# Patient Record
Sex: FEMALE | Race: BLACK OR AFRICAN AMERICAN | NOT HISPANIC OR LATINO | Employment: OTHER | URBAN - METROPOLITAN AREA
[De-identification: names, ages, dates, MRNs, and addresses within clinical notes are randomized per-mention and may not be internally consistent; named-entity substitution may affect disease eponyms.]

---

## 2023-07-29 ENCOUNTER — HOSPITAL ENCOUNTER (OUTPATIENT)
Dept: DATA CONVERSION | Facility: HOSPITAL | Age: 88
Discharge: HOME | End: 2023-07-30
Attending: EMERGENCY MEDICINE

## 2023-07-29 DIAGNOSIS — M54.6 PAIN IN THORACIC SPINE: ICD-10-CM

## 2023-07-29 DIAGNOSIS — S22.089A UNSPECIFIED FRACTURE OF T11-T12 VERTEBRA, INITIAL ENCOUNTER FOR CLOSED FRACTURE (MULTI): Primary | ICD-10-CM

## 2023-07-29 DIAGNOSIS — S09.90XA UNSPECIFIED INJURY OF HEAD, INITIAL ENCOUNTER: ICD-10-CM
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
## 2023-12-22 ENCOUNTER — OFFICE VISIT (OUTPATIENT)
Dept: PRIMARY CARE | Facility: CLINIC | Age: 88
End: 2023-12-22
Payer: MEDICARE

## 2023-12-22 ENCOUNTER — LAB (OUTPATIENT)
Dept: LAB | Facility: LAB | Age: 88
End: 2023-12-22
Payer: MEDICARE

## 2023-12-22 VITALS
OXYGEN SATURATION: 93 % | HEART RATE: 98 BPM | SYSTOLIC BLOOD PRESSURE: 122 MMHG | WEIGHT: 103.2 LBS | BODY MASS INDEX: 20.15 KG/M2 | TEMPERATURE: 97.5 F | RESPIRATION RATE: 20 BRPM | DIASTOLIC BLOOD PRESSURE: 68 MMHG

## 2023-12-22 DIAGNOSIS — R30.0 DYSURIA: Primary | ICD-10-CM

## 2023-12-22 DIAGNOSIS — R63.4 WEIGHT LOSS: ICD-10-CM

## 2023-12-22 DIAGNOSIS — R10.13 EPIGASTRIC PAIN: ICD-10-CM

## 2023-12-22 DIAGNOSIS — N30.01 ACUTE CYSTITIS WITH HEMATURIA: ICD-10-CM

## 2023-12-22 LAB
ALBUMIN SERPL-MCNC: 4.2 G/DL (ref 3.5–5)
ALP BLD-CCNC: 60 U/L (ref 35–125)
ALT SERPL-CCNC: 17 U/L (ref 5–40)
ANION GAP SERPL CALC-SCNC: 9 MMOL/L
AST SERPL-CCNC: 16 U/L (ref 5–40)
BASOPHILS # BLD AUTO: 0.03 X10*3/UL (ref 0–0.1)
BASOPHILS NFR BLD AUTO: 0.7 %
BILIRUB SERPL-MCNC: 0.5 MG/DL (ref 0.1–1.2)
BUN SERPL-MCNC: 16 MG/DL (ref 8–25)
CALCIUM SERPL-MCNC: 9.9 MG/DL (ref 8.5–10.4)
CHLORIDE SERPL-SCNC: 104 MMOL/L (ref 97–107)
CO2 SERPL-SCNC: 28 MMOL/L (ref 24–31)
CREAT SERPL-MCNC: 0.9 MG/DL (ref 0.4–1.6)
EOSINOPHIL # BLD AUTO: 0.07 X10*3/UL (ref 0–0.4)
EOSINOPHIL NFR BLD AUTO: 1.6 %
ERYTHROCYTE [DISTWIDTH] IN BLOOD BY AUTOMATED COUNT: 12.9 % (ref 11.5–14.5)
GFR SERPL CREATININE-BSD FRML MDRD: 61 ML/MIN/1.73M*2
GLUCOSE SERPL-MCNC: 99 MG/DL (ref 65–99)
HCT VFR BLD AUTO: 44.1 % (ref 36–46)
HGB BLD-MCNC: 14 G/DL (ref 12–16)
IMM GRANULOCYTES # BLD AUTO: 0.01 X10*3/UL (ref 0–0.5)
IMM GRANULOCYTES NFR BLD AUTO: 0.2 % (ref 0–0.9)
LIPASE SERPL-CCNC: 63 U/L (ref 16–63)
LYMPHOCYTES # BLD AUTO: 1.71 X10*3/UL (ref 0.8–3)
LYMPHOCYTES NFR BLD AUTO: 39.3 %
MCH RBC QN AUTO: 29.8 PG (ref 26–34)
MCHC RBC AUTO-ENTMCNC: 31.7 G/DL (ref 32–36)
MCV RBC AUTO: 94 FL (ref 80–100)
MONOCYTES # BLD AUTO: 0.28 X10*3/UL (ref 0.05–0.8)
MONOCYTES NFR BLD AUTO: 6.4 %
NEUTROPHILS # BLD AUTO: 2.25 X10*3/UL (ref 1.6–5.5)
NEUTROPHILS NFR BLD AUTO: 51.8 %
NRBC BLD-RTO: 0 /100 WBCS (ref 0–0)
PLATELET # BLD AUTO: 201 X10*3/UL (ref 150–450)
POC APPEARANCE, URINE: ABNORMAL
POC BILIRUBIN, URINE: ABNORMAL
POC BLOOD, URINE: ABNORMAL
POC COLOR, URINE: YELLOW
POC GLUCOSE, URINE: NEGATIVE MG/DL
POC KETONES, URINE: NEGATIVE MG/DL
POC LEUKOCYTES, URINE: ABNORMAL
POC NITRITE,URINE: NEGATIVE
POC PH, URINE: 5 PH
POC PROTEIN, URINE: ABNORMAL MG/DL
POC SPECIFIC GRAVITY, URINE: 1.02
POC UROBILINOGEN, URINE: 1 EU/DL
POTASSIUM SERPL-SCNC: 4.2 MMOL/L (ref 3.4–5.1)
PROT SERPL-MCNC: 6.5 G/DL (ref 5.9–7.9)
RBC # BLD AUTO: 4.7 X10*6/UL (ref 4–5.2)
SODIUM SERPL-SCNC: 141 MMOL/L (ref 133–145)
TSH SERPL DL<=0.05 MIU/L-ACNC: 2.75 MIU/L (ref 0.27–4.2)
VIT B12 SERPL-MCNC: 991 PG/ML (ref 211–946)
WBC # BLD AUTO: 4.4 X10*3/UL (ref 4.4–11.3)

## 2023-12-22 PROCEDURE — 81002 URINALYSIS NONAUTO W/O SCOPE: CPT | Performed by: STUDENT IN AN ORGANIZED HEALTH CARE EDUCATION/TRAINING PROGRAM

## 2023-12-22 PROCEDURE — 80053 COMPREHEN METABOLIC PANEL: CPT

## 2023-12-22 PROCEDURE — 1036F TOBACCO NON-USER: CPT | Performed by: STUDENT IN AN ORGANIZED HEALTH CARE EDUCATION/TRAINING PROGRAM

## 2023-12-22 PROCEDURE — 1126F AMNT PAIN NOTED NONE PRSNT: CPT | Performed by: STUDENT IN AN ORGANIZED HEALTH CARE EDUCATION/TRAINING PROGRAM

## 2023-12-22 PROCEDURE — 99214 OFFICE O/P EST MOD 30 MIN: CPT | Performed by: STUDENT IN AN ORGANIZED HEALTH CARE EDUCATION/TRAINING PROGRAM

## 2023-12-22 PROCEDURE — 85025 COMPLETE CBC W/AUTO DIFF WBC: CPT

## 2023-12-22 PROCEDURE — 99204 OFFICE O/P NEW MOD 45 MIN: CPT | Performed by: STUDENT IN AN ORGANIZED HEALTH CARE EDUCATION/TRAINING PROGRAM

## 2023-12-22 PROCEDURE — 87086 URINE CULTURE/COLONY COUNT: CPT | Performed by: STUDENT IN AN ORGANIZED HEALTH CARE EDUCATION/TRAINING PROGRAM

## 2023-12-22 PROCEDURE — 82607 VITAMIN B-12: CPT

## 2023-12-22 PROCEDURE — 83690 ASSAY OF LIPASE: CPT

## 2023-12-22 PROCEDURE — 36415 COLL VENOUS BLD VENIPUNCTURE: CPT

## 2023-12-22 PROCEDURE — 84443 ASSAY THYROID STIM HORMONE: CPT

## 2023-12-22 PROCEDURE — 1159F MED LIST DOCD IN RCRD: CPT | Performed by: STUDENT IN AN ORGANIZED HEALTH CARE EDUCATION/TRAINING PROGRAM

## 2023-12-22 RX ORDER — PANTOPRAZOLE SODIUM 20 MG/1
20 TABLET, DELAYED RELEASE ORAL
Qty: 30 TABLET | Refills: 11 | Status: SHIPPED | OUTPATIENT
Start: 2023-12-22 | End: 2024-05-27 | Stop reason: WASHOUT

## 2023-12-22 RX ORDER — CHOLECALCIFEROL (VITAMIN D3) 50 MCG
2000 TABLET ORAL DAILY
COMMUNITY

## 2023-12-22 RX ORDER — NITROFURANTOIN 25; 75 MG/1; MG/1
100 CAPSULE ORAL 2 TIMES DAILY
Qty: 10 CAPSULE | Refills: 0 | Status: SHIPPED | OUTPATIENT
Start: 2023-12-22 | End: 2023-12-27

## 2023-12-22 RX ORDER — GLUCOSAM/CHONDRO/HERB 149/HYAL 750-100 MG
1000 TABLET ORAL EVERY 24 HOURS
COMMUNITY

## 2023-12-22 RX ORDER — LATANOPROST 50 UG/ML
1 SOLUTION/ DROPS OPHTHALMIC DAILY
COMMUNITY

## 2023-12-22 RX ORDER — ASPIRIN 81 MG/1
81 TABLET ORAL DAILY
COMMUNITY

## 2023-12-22 RX ORDER — ALBUTEROL SULFATE 90 UG/1
2 AEROSOL, METERED RESPIRATORY (INHALATION) EVERY 4 HOURS PRN
COMMUNITY

## 2023-12-22 ASSESSMENT — COLUMBIA-SUICIDE SEVERITY RATING SCALE - C-SSRS
2. HAVE YOU ACTUALLY HAD ANY THOUGHTS OF KILLING YOURSELF?: NO
6. HAVE YOU EVER DONE ANYTHING, STARTED TO DO ANYTHING, OR PREPARED TO DO ANYTHING TO END YOUR LIFE?: NO
1. IN THE PAST MONTH, HAVE YOU WISHED YOU WERE DEAD OR WISHED YOU COULD GO TO SLEEP AND NOT WAKE UP?: NO

## 2023-12-22 ASSESSMENT — ENCOUNTER SYMPTOMS
DEPRESSION: 0
OCCASIONAL FEELINGS OF UNSTEADINESS: 1
LOSS OF SENSATION IN FEET: 0

## 2023-12-22 ASSESSMENT — PATIENT HEALTH QUESTIONNAIRE - PHQ9
1. LITTLE INTEREST OR PLEASURE IN DOING THINGS: NOT AT ALL
2. FEELING DOWN, DEPRESSED OR HOPELESS: NOT AT ALL
SUM OF ALL RESPONSES TO PHQ9 QUESTIONS 1 AND 2: 0

## 2023-12-22 ASSESSMENT — PAIN SCALES - GENERAL: PAINLEVEL: 0-NO PAIN

## 2023-12-22 NOTE — PATIENT INSTRUCTIONS
It was a pleasure to meet you today.    Go to the lab for blood work, we will call you with all results.    Antibiotic (Macrobid) and pantoprazole (acid reducer) sent to your pharmacy.    Try boost supplements to help increase your daily caloric intake.  Try to incorporate more healthy sources of high-calorie foods such as peanut butter, olive oil, avocados into your diet for weight gain rather than sweets such as cookies and donuts.    Gastroenterology referral entered today, call to schedule.

## 2023-12-22 NOTE — PROGRESS NOTES
Subjective   Kathy Tucker is a 89 y.o. female who presents for Abdominal Pain.    HPI:      New patient presenting with concern for abdominal pain/cramping with eating and dysuria.    Abdominal Pain and Weight Loss:  -Cramping every time she eats.  -Had been using Ensure for years to help maintain sufficient caloric intake, but was having diarrhea after every time she drinks this.  Stopped drinking Ensure a few months ago and diarrhea subsided.  She was previously drinking 2 per day.  She has noted weight loss in general, and especially since stopping Ensure.  States that she weighed 120 pounds 2 years ago, she is 103 pounds today.    -Reports loss of taste, she attributes this to the COVID-vaccine.  Feels like this also makes it hard for her to get sufficient calorie intake since nothing tastes good.  -Eats oatmeal, toast, breakfast bars.  Protein sources other than eggs include chicken and fish  -Mentions trying to eat doughnuts to help gain weight, asking for my thoughts on this.  -History of cholecystectomy in 2006.  Difficulty tolerating dairy.  No other previous GI issues.   -Prior GI specialist Dr. Bear with [facility], last seen for colonoscopy 5 years ago.  No hx of EGD.  Would like to see a different GI specialist if indicated.  -Declines offer for nutritionist/dietitian.    Dysupria:  Sees a specilist, has a pessary.    Dysuria for 2 months.    Hx of frequent UTIs  No abx allergies    Immunization History   Administered Date(s) Administered    Moderna SARS-CoV-2 Vaccination 03/10/2021, 11/17/2021, 05/20/2022     ROS:   Review of systems is essentially negative for all systems except for any identified issues in HPI above.    Objective     /68   Pulse 98   Temp 36.4 °C (97.5 °F)   Resp 20   Wt 46.8 kg (103 lb 3.2 oz)   SpO2 93%   BMI 20.15 kg/m²      PHYSICAL EXAM    GENERAL  Well-appearing, pleasant and cooperative.  No acute distress.    HEENT  HEAD:   Normocephalic.  Atraumatic.  EYES:  PERRLA.  No  scleral icterus or conjunctival injection.  NECK:  No adenopathy.  No palpable thyroid enlargement or nodules.    THROAT:  Moist oropharynx without tonsillar enlargement or exudates.    LUNGS:    Clear to auscultation bilaterally.  No wheezes, rales, rhonchi.    CARDIAC:  Regular rate and rhythm.  Normal S1S2.  No murmurs/rubs/gallops.    ABDOMEN:  Epigastric and Suprapubic TTP without guarding or rebound.  Abdomen otherwise soft, non-tender, non-distended.  No hepatosplenomegaly.  Normoactive bowel sounds.    MUSCULOSKELETAL:  No gross abnormalities.   No joint swelling or erythema,.  No spinal or paraspinal tenderness to palpation.    EXTREMITIES:  No LE edema or cyanosis.      NEURO           Alert and oriented x3. No focal deficits.    PSYCH:          Affect appropriate.           Assessment/Plan   Problem List Items Addressed This Visit    None  Visit Diagnoses       Dysuria    -  Primary    Relevant Orders    POCT UA (nonautomated) manually resulted (Completed)    Urine Culture    Epigastric pain        Relevant Medications    pantoprazole (Protonix) 20 mg EC tablet    Other Relevant Orders    Referral to Gastroenterology    CBC and Auto Differential    Comprehensive metabolic panel    Lipase    Weight loss        Relevant Orders    Referral to Gastroenterology    CBC and Auto Differential    Comprehensive metabolic panel    Lipase    Tsh With Reflex To Free T4 If Abnormal    Vitamin B12    Acute cystitis with hematuria        Relevant Medications    nitrofurantoin, macrocrystal-monohydrate, (Macrobid) 100 mg capsule                 Radha Can MD

## 2023-12-24 LAB — BACTERIA UR CULT: NORMAL

## 2023-12-27 ENCOUNTER — OFFICE VISIT (OUTPATIENT)
Dept: PRIMARY CARE | Facility: CLINIC | Age: 88
End: 2023-12-27
Payer: MEDICARE

## 2023-12-27 VITALS
HEIGHT: 60 IN | BODY MASS INDEX: 20.22 KG/M2 | SYSTOLIC BLOOD PRESSURE: 104 MMHG | WEIGHT: 103 LBS | TEMPERATURE: 97.4 F | DIASTOLIC BLOOD PRESSURE: 70 MMHG

## 2023-12-27 DIAGNOSIS — R30.0 DYSURIA: ICD-10-CM

## 2023-12-27 DIAGNOSIS — R39.9 UTI SYMPTOMS: Primary | ICD-10-CM

## 2023-12-27 DIAGNOSIS — R35.0 URINARY FREQUENCY: ICD-10-CM

## 2023-12-27 PROCEDURE — 1125F AMNT PAIN NOTED PAIN PRSNT: CPT | Performed by: STUDENT IN AN ORGANIZED HEALTH CARE EDUCATION/TRAINING PROGRAM

## 2023-12-27 PROCEDURE — 87086 URINE CULTURE/COLONY COUNT: CPT | Performed by: STUDENT IN AN ORGANIZED HEALTH CARE EDUCATION/TRAINING PROGRAM

## 2023-12-27 PROCEDURE — 99213 OFFICE O/P EST LOW 20 MIN: CPT | Performed by: STUDENT IN AN ORGANIZED HEALTH CARE EDUCATION/TRAINING PROGRAM

## 2023-12-27 PROCEDURE — 1159F MED LIST DOCD IN RCRD: CPT | Performed by: STUDENT IN AN ORGANIZED HEALTH CARE EDUCATION/TRAINING PROGRAM

## 2023-12-27 PROCEDURE — 1036F TOBACCO NON-USER: CPT | Performed by: STUDENT IN AN ORGANIZED HEALTH CARE EDUCATION/TRAINING PROGRAM

## 2023-12-27 ASSESSMENT — ENCOUNTER SYMPTOMS
OCCASIONAL FEELINGS OF UNSTEADINESS: 1
LOSS OF SENSATION IN FEET: 0
DEPRESSION: 0

## 2023-12-27 ASSESSMENT — PATIENT HEALTH QUESTIONNAIRE - PHQ9
1. LITTLE INTEREST OR PLEASURE IN DOING THINGS: NOT AT ALL
SUM OF ALL RESPONSES TO PHQ9 QUESTIONS 1 AND 2: 0
2. FEELING DOWN, DEPRESSED OR HOPELESS: NOT AT ALL

## 2023-12-27 ASSESSMENT — PAIN SCALES - GENERAL: PAINLEVEL: 3

## 2023-12-27 NOTE — PROGRESS NOTES
Subjective   Kathy Tucker is a 89 y.o. female who presents for No chief complaint on file..    HPI:      Here to follow up on recent negative urine culture and ongoing urinary sx.  Completed Macrobid as prescribed.    Goes to the bathroom 4-5 times every night.  4 times during the day.  No blood, some burning at nihgt.  Drinking water and cranberry juice.  Chronic use of panty liners at night only.    No irritation that she has noticed.  No nausea/vomiting, fevers/chills, or flank pain.    ROS:   Review of systems is essentially negative for all systems except for any identified issues in HPI above.    Objective     There were no vitals taken for this visit.     PHYSICAL EXAM    GENERAL  Well-appearing, pleasant and cooperative.  No acute distress.    HEENT  HEAD:   Normocephalic.  Atraumatic.  EYES:  PERRLA.  No scleral icterus or conjunctival injection.    NECK:  No adenopathy.  No palpable thyroid enlargement or nodules.    THROAT:  Moist oropharynx without tonsillar enlargement or exudates.    LUNGS:    Clear to auscultation bilaterally.  No wheezes, rales, rhonchi.    CARDIAC:  Regular rate and rhythm.  Normal S1S2.  No murmurs/rubs/gallops.    ABDOMEN:  Soft, non-tender, non-distended.  No hepatosplenomegaly.  Normoactive bowel sounds.  No CVAT bilaterlally.    MUSCULOSKELETAL:  No gross abnormalities.   No joint swelling or erythema,.  No spinal or paraspinal tenderness to palpation.    EXTREMITIES:  No LE edema or cyanosis.      NEURO           Alert and oriented x3. No focal deficits.    PSYCH:          Affect appropriate.           Assessment/Plan   Problem List Items Addressed This Visit    None  Visit Diagnoses       UTI symptoms    -  Primary    Relevant Orders    Urine Culture    Urinary frequency        Urology referral provided today.  Unable to do POCT urine dip d/t recent use of Azo - repeat urine culture ordered.  Offered estrogen vag. cream rx - declined.    Relevant Orders    Referral to Urology     Dysuria        Relevant Orders    Referral to Urology                 Radha Can MD

## 2023-12-28 LAB — BACTERIA UR CULT: NORMAL

## 2024-01-05 ENCOUNTER — HOSPITAL ENCOUNTER (OUTPATIENT)
Dept: RADIOLOGY | Facility: HOSPITAL | Age: 89
Discharge: HOME | End: 2024-01-05
Payer: MEDICARE

## 2024-01-05 DIAGNOSIS — R63.4 ABNORMAL WEIGHT LOSS: ICD-10-CM

## 2024-01-05 PROCEDURE — 2550000001 HC RX 255 CONTRASTS: Performed by: STUDENT IN AN ORGANIZED HEALTH CARE EDUCATION/TRAINING PROGRAM

## 2024-01-05 PROCEDURE — 74174 CTA ABD&PLVS W/CONTRAST: CPT

## 2024-01-05 RX ADMIN — IOHEXOL 75 ML: 350 INJECTION, SOLUTION INTRAVENOUS at 09:29

## 2024-01-08 ENCOUNTER — TELEPHONE (OUTPATIENT)
Dept: PRIMARY CARE | Facility: CLINIC | Age: 89
End: 2024-01-08
Payer: MEDICARE

## 2024-03-18 ENCOUNTER — APPOINTMENT (OUTPATIENT)
Dept: PREADMISSION TESTING | Facility: HOSPITAL | Age: 89
End: 2024-03-18
Payer: MEDICARE

## 2024-03-20 ENCOUNTER — APPOINTMENT (OUTPATIENT)
Dept: GASTROENTEROLOGY | Facility: HOSPITAL | Age: 89
End: 2024-03-20
Payer: MEDICARE

## 2024-05-21 ENCOUNTER — OFFICE VISIT (OUTPATIENT)
Dept: PRIMARY CARE | Facility: CLINIC | Age: 89
End: 2024-05-21
Payer: MEDICARE

## 2024-05-21 VITALS
BODY MASS INDEX: 21.79 KG/M2 | HEIGHT: 60 IN | WEIGHT: 111 LBS | DIASTOLIC BLOOD PRESSURE: 64 MMHG | SYSTOLIC BLOOD PRESSURE: 98 MMHG | HEART RATE: 75 BPM | OXYGEN SATURATION: 96 % | TEMPERATURE: 75 F

## 2024-05-21 DIAGNOSIS — R43.8 COVID-19 LONG HAULER MANIFESTING CHRONIC LOSS OF SMELL AND TASTE: Primary | ICD-10-CM

## 2024-05-21 DIAGNOSIS — U09.9 COVID-19 LONG HAULER MANIFESTING CHRONIC LOSS OF SMELL AND TASTE: Primary | ICD-10-CM

## 2024-05-21 DIAGNOSIS — R30.0 DYSURIA: ICD-10-CM

## 2024-05-21 DIAGNOSIS — R35.0 URINARY FREQUENCY: ICD-10-CM

## 2024-05-21 PROCEDURE — 1159F MED LIST DOCD IN RCRD: CPT | Performed by: STUDENT IN AN ORGANIZED HEALTH CARE EDUCATION/TRAINING PROGRAM

## 2024-05-21 PROCEDURE — 1125F AMNT PAIN NOTED PAIN PRSNT: CPT | Performed by: STUDENT IN AN ORGANIZED HEALTH CARE EDUCATION/TRAINING PROGRAM

## 2024-05-21 PROCEDURE — 99213 OFFICE O/P EST LOW 20 MIN: CPT | Performed by: STUDENT IN AN ORGANIZED HEALTH CARE EDUCATION/TRAINING PROGRAM

## 2024-05-21 ASSESSMENT — PATIENT HEALTH QUESTIONNAIRE - PHQ9
SUM OF ALL RESPONSES TO PHQ9 QUESTIONS 1 AND 2: 0
2. FEELING DOWN, DEPRESSED OR HOPELESS: NOT AT ALL
1. LITTLE INTEREST OR PLEASURE IN DOING THINGS: NOT AT ALL

## 2024-05-21 ASSESSMENT — PAIN SCALES - GENERAL: PAINLEVEL: 7

## 2024-05-21 ASSESSMENT — ENCOUNTER SYMPTOMS
LOSS OF SENSATION IN FEET: 1
DEPRESSION: 0
OCCASIONAL FEELINGS OF UNSTEADINESS: 1

## 2024-05-21 NOTE — PROGRESS NOTES
Subjective   Kathy Tucker is a 89 y.o. female who presents for bp check.    HPI:      89-year-old female presenting for follow up visit.      Saw urologist who gave her a cram which didn't work.  Dr. Rutledge=amalia Polk Estradiol Vaginal Cream.    Ongoing loss of taste and smell years after COVID infection.    ROS:   Review of systems is essentially negative for all systems except for any identified issues in HPI above.    Objective     BP 98/64   Pulse 75   Temp (!) 23.9 °C (75 °F)   Ht 1.524 m (5')   Wt 50.3 kg (111 lb)   SpO2 96%   BMI 21.68 kg/m²      PHYSICAL EXAM    GENERAL  Well-appearing, pleasant and cooperative.  No acute distress.    HEENT  HEAD:   Normocephalic.  Atraumatic.  EYES:  PERRLA.  No scleral icterus or conjunctival injection.  EARS:  Tympanic membranes visualized bilaterally without erythema, fluid, or bulging.  NECK:  No adenopathy.  No palpable thyroid enlargement or nodules.    THROAT:  Moist oropharynx without tonsillar enlargement or exudates.    LUNGS:    Clear to auscultation bilaterally.  No wheezes, rales, rhonchi.    CARDIAC:  Regular rate and rhythm.  Normal S1S2.  No murmurs/rubs/gallops.    ABDOMEN:  Soft, non-tender, non-distended.  No hepatosplenomegaly.  Normoactive bowel sounds.    MUSCULOSKELETAL:  No gross abnormalities.   No joint swelling or erythema,.  No spinal or paraspinal tenderness to palpation.    EXTREMITIES:  No LE edema or cyanosis.      NEURO           Alert and oriented x3. No focal deficits.    PSYCH:          Affect appropriate.           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    Please sign a release of information form for your urologist (Dr. Polk)  so we can review testing done at your visit with him and any additional recommendations.    You had a Medicare wellness visit with your previous provider on 11/13/2023.  You will be due in November for this visit with me.    Business cards provided today.    Follow-up with me in November for Medicare wellness visit, as needed for acute concerns.         Radha Can MD

## 2024-05-21 NOTE — PATIENT INSTRUCTIONS
Thank you for coming to see me today.    ENT referral entered today for further evaluation of ongoing loss of taste/smell, call to schedule.    Please sign a release of information form for your urologist (Dr. Polk) so we can review testing done at your visit with him and any additional recommendations.    You had a Medicare wellness visit with your previous provider on 11/13/2023.  You will be due in November for this visit with me.    Business cards provided today.    Follow-up with me in November for Medicare wellness visit, as needed for acute concerns.

## 2024-07-10 ENCOUNTER — LAB (OUTPATIENT)
Dept: LAB | Facility: LAB | Age: 89
End: 2024-07-10
Payer: MEDICARE

## 2024-07-10 ENCOUNTER — HOSPITAL ENCOUNTER (OUTPATIENT)
Dept: RADIOLOGY | Facility: HOSPITAL | Age: 89
Discharge: HOME | End: 2024-07-10
Payer: MEDICARE

## 2024-07-10 DIAGNOSIS — J43.9 EMPHYSEMA, UNSPECIFIED (MULTI): ICD-10-CM

## 2024-07-10 DIAGNOSIS — R53.82 CHRONIC FATIGUE, UNSPECIFIED: Primary | ICD-10-CM

## 2024-07-10 LAB
ALBUMIN SERPL-MCNC: 3.9 G/DL (ref 3.5–5)
ALP BLD-CCNC: 61 U/L (ref 35–125)
ALT SERPL-CCNC: 24 U/L (ref 5–40)
ANION GAP SERPL CALC-SCNC: 8 MMOL/L
AST SERPL-CCNC: 24 U/L (ref 5–40)
BASOPHILS # BLD AUTO: 0.03 X10*3/UL (ref 0–0.1)
BASOPHILS NFR BLD AUTO: 0.8 %
BILIRUB SERPL-MCNC: 0.4 MG/DL (ref 0.1–1.2)
BUN SERPL-MCNC: 13 MG/DL (ref 8–25)
CALCIUM SERPL-MCNC: 9.5 MG/DL (ref 8.5–10.4)
CHLORIDE SERPL-SCNC: 105 MMOL/L (ref 97–107)
CO2 SERPL-SCNC: 27 MMOL/L (ref 24–31)
CREAT SERPL-MCNC: 0.8 MG/DL (ref 0.4–1.6)
EGFRCR SERPLBLD CKD-EPI 2021: 70 ML/MIN/1.73M*2
EOSINOPHIL # BLD AUTO: 0.06 X10*3/UL (ref 0–0.4)
EOSINOPHIL NFR BLD AUTO: 1.6 %
ERYTHROCYTE [DISTWIDTH] IN BLOOD BY AUTOMATED COUNT: 12.9 % (ref 11.5–14.5)
GLUCOSE SERPL-MCNC: 95 MG/DL (ref 65–99)
HCT VFR BLD AUTO: 41.3 % (ref 36–46)
HGB BLD-MCNC: 13.1 G/DL (ref 12–16)
IMM GRANULOCYTES # BLD AUTO: 0.01 X10*3/UL (ref 0–0.5)
IMM GRANULOCYTES NFR BLD AUTO: 0.3 % (ref 0–0.9)
LYMPHOCYTES # BLD AUTO: 1.88 X10*3/UL (ref 0.8–3)
LYMPHOCYTES NFR BLD AUTO: 49.3 %
MCH RBC QN AUTO: 30.7 PG (ref 26–34)
MCHC RBC AUTO-ENTMCNC: 31.7 G/DL (ref 32–36)
MCV RBC AUTO: 97 FL (ref 80–100)
MONOCYTES # BLD AUTO: 0.27 X10*3/UL (ref 0.05–0.8)
MONOCYTES NFR BLD AUTO: 7.1 %
NEUTROPHILS # BLD AUTO: 1.56 X10*3/UL (ref 1.6–5.5)
NEUTROPHILS NFR BLD AUTO: 40.9 %
NRBC BLD-RTO: 0 /100 WBCS (ref 0–0)
PLATELET # BLD AUTO: 181 X10*3/UL (ref 150–450)
POTASSIUM SERPL-SCNC: 4.7 MMOL/L (ref 3.4–5.1)
PROT SERPL-MCNC: 6.1 G/DL (ref 5.9–7.9)
RBC # BLD AUTO: 4.27 X10*6/UL (ref 4–5.2)
SODIUM SERPL-SCNC: 140 MMOL/L (ref 133–145)
WBC # BLD AUTO: 3.8 X10*3/UL (ref 4.4–11.3)

## 2024-07-10 PROCEDURE — 85025 COMPLETE CBC W/AUTO DIFF WBC: CPT

## 2024-07-10 PROCEDURE — 71046 X-RAY EXAM CHEST 2 VIEWS: CPT | Performed by: RADIOLOGY

## 2024-07-10 PROCEDURE — 80053 COMPREHEN METABOLIC PANEL: CPT

## 2024-07-10 PROCEDURE — 71046 X-RAY EXAM CHEST 2 VIEWS: CPT

## 2024-07-10 PROCEDURE — 36415 COLL VENOUS BLD VENIPUNCTURE: CPT

## 2024-08-28 ENCOUNTER — APPOINTMENT (OUTPATIENT)
Dept: OTOLARYNGOLOGY | Facility: CLINIC | Age: 89
End: 2024-08-28
Payer: MEDICARE

## 2024-08-28 DIAGNOSIS — H91.90 DECREASED HEARING, UNSPECIFIED LATERALITY: ICD-10-CM

## 2024-08-28 DIAGNOSIS — H61.21 IMPACTED CERUMEN OF RIGHT EAR: ICD-10-CM

## 2024-08-28 DIAGNOSIS — J33.9 NASAL POLYP: ICD-10-CM

## 2024-08-28 DIAGNOSIS — U09.9 COVID-19 LONG HAULER MANIFESTING CHRONIC LOSS OF SMELL AND TASTE: Primary | ICD-10-CM

## 2024-08-28 DIAGNOSIS — R43.8 COVID-19 LONG HAULER MANIFESTING CHRONIC LOSS OF SMELL AND TASTE: Primary | ICD-10-CM

## 2024-08-28 PROCEDURE — 1036F TOBACCO NON-USER: CPT | Performed by: OTOLARYNGOLOGY

## 2024-08-28 PROCEDURE — 1159F MED LIST DOCD IN RCRD: CPT | Performed by: OTOLARYNGOLOGY

## 2024-08-28 PROCEDURE — 69210 REMOVE IMPACTED EAR WAX UNI: CPT | Performed by: OTOLARYNGOLOGY

## 2024-08-28 PROCEDURE — 99203 OFFICE O/P NEW LOW 30 MIN: CPT | Performed by: OTOLARYNGOLOGY

## 2024-08-28 RX ORDER — SOD CHLOR,BICARB/SQUEEZ BOTTLE
1 PACKET, WITH RINSE DEVICE NASAL DAILY
Qty: 60 EACH | Refills: 0 | Status: SHIPPED | OUTPATIENT
Start: 2024-08-28 | End: 2024-09-27

## 2024-08-28 RX ORDER — SOD CHLOR,BICARB/SQUEEZ BOTTLE
1 PACKET, WITH RINSE DEVICE NASAL DAILY
Qty: 60 EACH | Refills: 0 | Status: SHIPPED | OUTPATIENT
Start: 2024-08-28 | End: 2024-08-28

## 2024-08-28 RX ORDER — BUDESONIDE 0.5 MG/2ML
INHALANT ORAL
Qty: 60 ML | Refills: 1 | Status: SHIPPED | OUTPATIENT
Start: 2024-08-28

## 2024-08-28 NOTE — LETTER
"August 28, 2024     Radha Can MD  [id number] [facility]  [facility] [address]    Patient: Kathy Tucker   YOB: 1934   Date of Visit: 8/28/2024       Dear Dr. Radha Can MD:    Thank you for referring Kathy Tucker to me for evaluation. Below are my notes for this consultation.  If you have questions, please do not hesitate to call me. I look forward to following your patient along with you.       Sincerely,     Francine Blue MD      CC: No Recipients  ______________________________________________________________________________________    History Of Present Illness  Kathy Tucker is a 89 y.o. female presenting with: \"Lost my taste and smell, hearing test\".  She is kindly referred by Radha Can MD.    She has decreased sense of smell and taste for the last 3 years, started with covid.     On examination, patient has pale inferior turbinate mucosa. Nasal passages look open bilaterally. No visible postnasal discharge.  Small ~1x1 cm mobil adenopathy at left upper jugular area.    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    Recommendations:  1- smell retraining therapy  2- budesonide rinse for 3 months     Past Medical History  She has a past medical history of Lung cancer (Multi).    Surgical History  She has a past surgical history that includes CT angio abdomen pelvis w and or wo IV IV contrast (01/05/2024) and Other surgical history.     Social History  She reports that she has never smoked. She has never used smokeless tobacco. She reports that she does not drink alcohol and does not use drugs.    Family History  Family History   Problem Relation Name Age of Onset   • Cancer Mother          Allergies  Patient has no known allergies.    Review of Systems   Feeling tired  Difficulty hearing  Shortness of breath  Muscle pain  Sleep disturbance     Physical Exam    General appearance: Healthy-appearing, well-nourished, well groomed, in no acute " "distress.     Head and Face: Atraumatic with no masses, lesions, or scarring.      Salivary glands: No tenderness of the parotid glands or parotid masses.     No tenderness of the submandibular glands or submandibular masses.      Facial strength: Normal strength and symmetry, no synkinesis or facial tic.     Eyes: Conjunctivas look non-hyperemic bilaterally    Ears: Bilaterally wax was cleaned, ear canals look normal. Tympanic membranes look intact, no hyperemia, fluid or retraction.      Nose: Mucosa looks normal. No purulent discharge. Septum essentially straight.     Oral Cavity/Mouth: Lips and tongue look normal.     Throat: No postnasal discharge. No tonsil hypertrophy. No hyperemia.    Neck: Symmetrical, trachea midline.     Pulmonary: Normal respiratory effort.     Lymphatic: No palpable pathologic lymph nodes at neck.     Neurological/Psychiatric Orientation to person, place, and time: Normal.     Mood and affect: Normal.      Extremities: No clubbing.     Skin: No significant skin lesions were noted at face or neck        Procedure    EAR WAX REMOVAL 08.28.2024  Patient had bilateral ear wax. Using small instrument(s) and/or suction cleaning was done. Patient tolerated the procedure well.        Last Recorded Vitals  There were no vitals taken for this visit.    Relevant Results    Assessment and Plan:  Kathy Tucker is a 89 y.o. female presenting with: \"Lost my taste and smell, hearing test\".  She is kindly referred by Radha Can MD.    She has decreased sense of smell and taste for the last 3 years, started with covid.     On examination, patient has pale inferior turbinate mucosa. Nasal passages look open bilaterally. No visible postnasal discharge.  Small ~1x1 cm mobil adenopathy at left upper jugular area.    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    Recommendations:  1- smell retraining therapy  2- budesonide rinse for 3 months      [facility]  Otolaryngology - Head & Neck " Surgery

## 2024-08-28 NOTE — LETTER
"August 28, 2024     Radha Can MD  [address] [facility]  [facility] [address]    Patient: Kathy Tucker   YOB: 1934   Date of Visit: 8/28/2024       Dear Dr. Radha Can MD:    Thank you for referring Kathy Tucker to me for evaluation. Below are my notes for this consultation.  If you have questions, please do not hesitate to call me. I look forward to following your patient along with you.       Sincerely,     Francine Blue MD      CC: No Recipients  ______________________________________________________________________________________    History Of Present Illness  Kathy Tucker is a 89 y.o. female presenting with: \"Lost my taste and smell, hearing test\".  She is kindly referred by Radha Can MD.    She has decreased sense of smell and taste for the last 3 years, started with covid.     On examination, patient has pale inferior turbinate mucosa. Nasal passages look open bilaterally. No visible postnasal discharge.  Small ~1x1 cm mobil adenopathy at left upper jugular area.    Recommendations:  1- smell retraining therapy  2- budesonide rinse for 3 months     Past Medical History  She has a past medical history of Lung cancer (Multi).    Surgical History  She has a past surgical history that includes CT angio abdomen pelvis w and or wo IV IV contrast (01/05/2024) and Other surgical history.     Social History  She reports that she has never smoked. She has never used smokeless tobacco. She reports that she does not drink alcohol and does not use drugs.    Family History  Family History   Problem Relation Name Age of Onset   • Cancer Mother          Allergies  Patient has no known allergies.    Review of Systems   Feeling tired  Difficulty hearing  Shortness of breath  Muscle pain  Sleep disturbance     Physical Exam    General appearance: Healthy-appearing, well-nourished, well groomed, in no acute distress.     Head and Face: Atraumatic with no masses, " "lesions, or scarring.      Salivary glands: No tenderness of the parotid glands or parotid masses.     No tenderness of the submandibular glands or submandibular masses.      Facial strength: Normal strength and symmetry, no synkinesis or facial tic.     Eyes: Conjunctivas look non-hyperemic bilaterally    Ears: Bilaterally wax was cleaned, ear canals look normal. Tympanic membranes look intact, no hyperemia, fluid or retraction.      Nose: Mucosa looks normal. No purulent discharge. Septum essentially straight.     Oral Cavity/Mouth: Lips and tongue look normal.     Throat: No postnasal discharge. No tonsil hypertrophy. No hyperemia.    Neck: Symmetrical, trachea midline.     Pulmonary: Normal respiratory effort.     Lymphatic: No palpable pathologic lymph nodes at neck.     Neurological/Psychiatric Orientation to person, place, and time: Normal.     Mood and affect: Normal.      Extremities: No clubbing.     Skin: No significant skin lesions were noted at face or neck        Procedure    EAR WAX REMOVAL 08.28.2024  Patient had bilateral ear wax. Using small instrument(s) and/or suction cleaning was done. Patient tolerated the procedure well.        Last Recorded Vitals  There were no vitals taken for this visit.    Relevant Results    Assessment and Plan:  Kathy Tucker is a 89 y.o. female presenting with: \"Lost my taste and smell, hearing test\".  She is kindly referred by Radha Can MD.    She has decreased sense of smell and taste for the last 3 years, started with covid.     On examination, patient has pale inferior turbinate mucosa. Nasal passages look open bilaterally. No visible postnasal discharge.  Small ~1x1 cm mobil adenopathy at left upper jugular area.    Recommendations:  1- smell retraining therapy  2- budesonide rinse for 3 months      [facility]  Otolaryngology - Head & Neck Surgery  "

## 2024-08-28 NOTE — PROGRESS NOTES
"History Of Present Illness  Kathy Tucker is a 89 y.o. female presenting with: \"Lost my taste and smell, hearing test\".  She is kindly referred by Radha Can MD.    She has decreased sense of smell and taste for the last 3 years, started with covid.     On examination, patient has pale inferior turbinate mucosa. Nasal passages look open bilaterally. No visible postnasal discharge.  Small ~1x1 cm mobil adenopathy at left upper jugular area.    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    Recommendations:  1- smell retraining therapy  2- budesonide rinse for 3 months  3- hearing test for decreased hearing     Past Medical History  She has a past medical history of Lung cancer (Multi).    Surgical History  She has a past surgical history that includes CT angio abdomen pelvis w and or wo IV IV contrast (01/05/2024) and Other surgical history.     Social History  She reports that she has never smoked. She has never used smokeless tobacco. She reports that she does not drink alcohol and does not use drugs.    Family History  Family History   Problem Relation Name Age of Onset    Cancer Mother          Allergies  Patient has no known allergies.    Review of Systems   Feeling tired  Difficulty hearing  Shortness of breath  Muscle pain  Sleep disturbance     Physical Exam    General appearance: Healthy-appearing, well-nourished, well groomed, in no acute distress.     Head and Face: Atraumatic with no masses, lesions, or scarring.      Salivary glands: No tenderness of the parotid glands or parotid masses.     No tenderness of the submandibular glands or submandibular masses.      Facial strength: Normal strength and symmetry, no synkinesis or facial tic.     Eyes: Conjunctivas look non-hyperemic bilaterally    Ears: Bilaterally wax was cleaned, ear canals look normal. Tympanic membranes look intact, no hyperemia, fluid or retraction.      Nose: Mucosa looks normal. No purulent discharge. Septum essentially " "straight.     Oral Cavity/Mouth: Lips and tongue look normal.     Throat: No postnasal discharge. No tonsil hypertrophy. No hyperemia.    Neck: Symmetrical, trachea midline.     Pulmonary: Normal respiratory effort.     Lymphatic: No palpable pathologic lymph nodes at neck.     Neurological/Psychiatric Orientation to person, place, and time: Normal.     Mood and affect: Normal.      Extremities: No clubbing.     Skin: No significant skin lesions were noted at face or neck        Procedure    EAR WAX REMOVAL 08.28.2024  Patient had bilateral ear wax. Using small instrument(s) and/or suction cleaning was done. Patient tolerated the procedure well.        Last Recorded Vitals  There were no vitals taken for this visit.    Relevant Results    Assessment and Plan:  Kathy Tucker is a 89 y.o. female presenting with: \"Lost my taste and smell, hearing test\".  She is kindly referred by Radha Can MD.    She has decreased sense of smell and taste for the last 3 years, started with covid.     On examination, patient has pale inferior turbinate mucosa. Nasal passages look open bilaterally. No visible postnasal discharge.  Small ~1x1 cm mobil adenopathy at left upper jugular area.    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    Recommendations:  1- smell retraining therapy  2- budesonide rinse for 3 months  3- hearing test for decreased hearing      Francine Blue  Otolaryngology - Head & Neck Surgery  "

## 2024-09-03 ENCOUNTER — TELEPHONE (OUTPATIENT)
Dept: PRIMARY CARE | Facility: CLINIC | Age: 89
End: 2024-09-03
Payer: MEDICARE

## 2024-09-03 NOTE — TELEPHONE ENCOUNTER
I am also not sure who called her.  She is due for her Medicare wellness visit so I would recommend that she schedule this appointment when able.

## 2024-09-03 NOTE — TELEPHONE ENCOUNTER
Pt left voicemail stating [facility] called her to advise she needs to schedule with PCP per PCP request. Pt did not specify who called her and wants to know if she does need this appointment. Please advise as I do not see an open message for this.

## 2024-09-08 ENCOUNTER — APPOINTMENT (OUTPATIENT)
Dept: RADIOLOGY | Facility: HOSPITAL | Age: 89
End: 2024-09-08
Payer: MEDICARE

## 2024-09-08 ENCOUNTER — HOSPITAL ENCOUNTER (EMERGENCY)
Facility: HOSPITAL | Age: 89
Discharge: HOME | End: 2024-09-08
Payer: MEDICARE

## 2024-09-08 VITALS
BODY MASS INDEX: 22.18 KG/M2 | WEIGHT: 110 LBS | HEIGHT: 59 IN | DIASTOLIC BLOOD PRESSURE: 62 MMHG | HEART RATE: 66 BPM | TEMPERATURE: 97.9 F | OXYGEN SATURATION: 100 % | RESPIRATION RATE: 20 BRPM | SYSTOLIC BLOOD PRESSURE: 117 MMHG

## 2024-09-08 DIAGNOSIS — M79.661 RIGHT CALF PAIN: Primary | ICD-10-CM

## 2024-09-08 PROCEDURE — 99284 EMERGENCY DEPT VISIT MOD MDM: CPT | Mod: 25

## 2024-09-08 PROCEDURE — 2500000001 HC RX 250 WO HCPCS SELF ADMINISTERED DRUGS (ALT 637 FOR MEDICARE OP)

## 2024-09-08 PROCEDURE — 93971 EXTREMITY STUDY: CPT

## 2024-09-08 PROCEDURE — 93971 EXTREMITY STUDY: CPT | Performed by: RADIOLOGY

## 2024-09-08 RX ORDER — OXYCODONE AND ACETAMINOPHEN 5; 325 MG/1; MG/1
1 TABLET ORAL EVERY 6 HOURS PRN
Qty: 12 TABLET | Refills: 0 | Status: SHIPPED | OUTPATIENT
Start: 2024-09-08 | End: 2024-09-11

## 2024-09-08 RX ORDER — CYCLOBENZAPRINE HCL 10 MG
10 TABLET ORAL 2 TIMES DAILY PRN
Qty: 20 TABLET | Refills: 0 | Status: SHIPPED | OUTPATIENT
Start: 2024-09-08 | End: 2024-09-18

## 2024-09-08 RX ORDER — OXYCODONE AND ACETAMINOPHEN 5; 325 MG/1; MG/1
1 TABLET ORAL ONCE
Status: COMPLETED | OUTPATIENT
Start: 2024-09-08 | End: 2024-09-08

## 2024-09-08 ASSESSMENT — LIFESTYLE VARIABLES
HAVE YOU EVER FELT YOU SHOULD CUT DOWN ON YOUR DRINKING: NO
EVER HAD A DRINK FIRST THING IN THE MORNING TO STEADY YOUR NERVES TO GET RID OF A HANGOVER: NO
EVER FELT BAD OR GUILTY ABOUT YOUR DRINKING: NO
TOTAL SCORE: 0
HAVE PEOPLE ANNOYED YOU BY CRITICIZING YOUR DRINKING: NO

## 2024-09-08 ASSESSMENT — PAIN SCALES - GENERAL
PAINLEVEL_OUTOF10: 10 - WORST POSSIBLE PAIN
PAINLEVEL_OUTOF10: 10 - WORST POSSIBLE PAIN

## 2024-09-08 ASSESSMENT — PAIN DESCRIPTION - DESCRIPTORS: DESCRIPTORS: ACHING;SORE;THROBBING

## 2024-09-08 ASSESSMENT — PAIN DESCRIPTION - LOCATION: LOCATION: LEG

## 2024-09-08 ASSESSMENT — PAIN DESCRIPTION - PAIN TYPE: TYPE: ACUTE PAIN

## 2024-09-08 ASSESSMENT — PAIN - FUNCTIONAL ASSESSMENT: PAIN_FUNCTIONAL_ASSESSMENT: 0-10

## 2024-09-08 ASSESSMENT — PAIN DESCRIPTION - PROGRESSION: CLINICAL_PROGRESSION: NOT CHANGED

## 2024-09-08 ASSESSMENT — PAIN DESCRIPTION - ORIENTATION: ORIENTATION: RIGHT

## 2024-09-09 NOTE — ED PROVIDER NOTES
HPI   Chief Complaint   Patient presents with    Leg Pain     Right calf pain started really bad yesterday, started a week ago. Swollen yesterday. Not hot to touch.       HPI  Patient is a 89-year-old female who presents to ED for right calf pain that started yesterday.  Patient reports calf swelling.  Denies any history of DVT or PE.  Denies blood thinner use.  Patient denies any recent travel or surgeries or hospitalizations.  She does state that she gets frequent leg cramps.  She denies any fever chills, cough or congestion, headache or dizziness, chest pain or shortness of breath, abdominal pain or NVD, urinary symptoms.      Patient History   Past Medical History:   Diagnosis Date    Lung cancer (Multi)      Past Surgical History:   Procedure Laterality Date    CT ABDOMEN PELVIS ANGIOGRAM W AND/OR WO IV CONTRAST  01/05/2024    CT ABDOMEN PELVIS ANGIOGRAM W AND/OR WO IV CONTRAST 1/5/2024 [facility] CT    OTHER SURGICAL HISTORY      lefdt lung removal     Family History   Problem Relation Name Age of Onset    Cancer Mother       Social History     Tobacco Use    Smoking status: Never    Smokeless tobacco: Never   Substance Use Topics    Alcohol use: Never    Drug use: Never       Physical Exam   ED Triage Vitals [09/08/24 1635]   Temperature Heart Rate Respirations BP   36.6 °C (97.9 °F) 66 20 117/62      Pulse Ox Temp Source Heart Rate Source Patient Position   100 % Tympanic -- Sitting      BP Location FiO2 (%)     Right arm --       Physical Exam  Vitals reviewed.   Constitutional:       General: She is not in acute distress.     Appearance: Normal appearance. She is not ill-appearing.   HENT:      Head: Normocephalic and atraumatic.   Eyes:      Extraocular Movements: Extraocular movements intact.   Cardiovascular:      Rate and Rhythm: Normal rate and regular rhythm.      Heart sounds: Normal heart sounds.   Pulmonary:      Effort: Pulmonary effort is normal.      Breath sounds: Normal breath sounds.   Abdominal:       General: Abdomen is flat.   Musculoskeletal:      Cervical back: Normal range of motion and neck supple.      Right lower leg: No swelling, deformity or tenderness. No edema.      Right foot: Normal pulse.      Comments: No evidence of cellulitis or DVT.   Skin:     General: Skin is warm and dry.   Neurological:      General: No focal deficit present.      Mental Status: She is alert and oriented to person, place, and time.   Psychiatric:         Mood and Affect: Mood normal.         Behavior: Behavior normal.           ED Course & MDM   Diagnoses as of 09/08/24 2110   Right calf pain                 No data recorded     [place] Coma Scale Score: 15 (09/08/24 1911 : Caren Coronado RN)                           Medical Decision Making  Parts of this chart have been completed using voice recognition software. Please excuse any errors of transcription.  My thought process and reason for plan has been formulated from the time that I saw the patient until the time of disposition and is not specific to one specific moment during their visit and furthermore my MDM encompasses this entire chart and not only this text box.    HPI:   Detailed above.    Exam:   A medically appropriate exam performed, outlined above, given the known history and presentation.    History obtained from:   Patient    EKG/Cardiac monitor:     Social Determinants of Health considered during this visit:     Medications given during visit:  Medications   oxyCODONE-acetaminophen (Percocet) 5-325 mg per tablet 1 tablet (1 tablet oral Given 9/8/24 1906)        Diagnostic/tests:  Labs Reviewed - No data to display   Lower extremity venous duplex right   Final Result   No evidence of acute DVT in the right lower extremity.        Signed by: Tyrel Manley 9/8/2024 6:51 PM   Dictation workstation:   [id number]               MDM Summary:  Ultrasound is negative.  No evidence of cellulitis.  Patient is requesting Percocet.  Return precautions were  discussed.  Patient will follow-up with her PCP.    We have discussed the diagnosis and risks, and we agree with discharging home to follow-up with appropriate physician as directed. We also discussed returning to the Emergency Department immediately if new or worsening symptoms occur. We have discussed the symptoms which are most concerning that necessitate immediate return. Pt symptoms have been well controlled here and the patient is safe for discharge with appropriate outpatient follow up. The patient has verbalized understanding to return to ER without delay for new or worsening pains or for any other symptoms or concerns. I utilized the discharge clinical management tool provided [employer] to help estimate risk of negative outcome for this patient.        Procedure  Procedures     Tyrel Hutchins PA-C  09/08/24 0400

## 2024-09-13 ENCOUNTER — APPOINTMENT (OUTPATIENT)
Dept: RADIOLOGY | Facility: HOSPITAL | Age: 89
End: 2024-09-13
Payer: MEDICARE

## 2024-09-13 ENCOUNTER — HOSPITAL ENCOUNTER (EMERGENCY)
Facility: HOSPITAL | Age: 89
Discharge: HOME | End: 2024-09-13
Payer: MEDICARE

## 2024-09-13 VITALS
OXYGEN SATURATION: 99 % | HEART RATE: 86 BPM | HEIGHT: 59 IN | SYSTOLIC BLOOD PRESSURE: 117 MMHG | RESPIRATION RATE: 18 BRPM | TEMPERATURE: 98.4 F | BODY MASS INDEX: 22.18 KG/M2 | WEIGHT: 110 LBS | DIASTOLIC BLOOD PRESSURE: 71 MMHG

## 2024-09-13 DIAGNOSIS — M17.11 ARTHRITIS OF KNEE, RIGHT: Primary | ICD-10-CM

## 2024-09-13 PROCEDURE — 73590 X-RAY EXAM OF LOWER LEG: CPT | Mod: RIGHT SIDE

## 2024-09-13 PROCEDURE — 99284 EMERGENCY DEPT VISIT MOD MDM: CPT

## 2024-09-13 PROCEDURE — 73564 X-RAY EXAM KNEE 4 OR MORE: CPT | Mod: RT

## 2024-09-13 PROCEDURE — 73564 X-RAY EXAM KNEE 4 OR MORE: CPT | Mod: RIGHT SIDE

## 2024-09-13 PROCEDURE — 73590 X-RAY EXAM OF LOWER LEG: CPT | Mod: RT

## 2024-09-13 PROCEDURE — 2500000001 HC RX 250 WO HCPCS SELF ADMINISTERED DRUGS (ALT 637 FOR MEDICARE OP)

## 2024-09-13 RX ORDER — IBUPROFEN 400 MG/1
400 TABLET ORAL ONCE
Status: COMPLETED | OUTPATIENT
Start: 2024-09-13 | End: 2024-09-13

## 2024-09-13 RX ORDER — ACETAMINOPHEN 325 MG/1
650 TABLET ORAL ONCE
Status: COMPLETED | OUTPATIENT
Start: 2024-09-13 | End: 2024-09-13

## 2024-09-13 ASSESSMENT — PAIN - FUNCTIONAL ASSESSMENT: PAIN_FUNCTIONAL_ASSESSMENT: 0-10

## 2024-09-13 ASSESSMENT — LIFESTYLE VARIABLES
EVER HAD A DRINK FIRST THING IN THE MORNING TO STEADY YOUR NERVES TO GET RID OF A HANGOVER: NO
HAVE YOU EVER FELT YOU SHOULD CUT DOWN ON YOUR DRINKING: NO
TOTAL SCORE: 0
EVER FELT BAD OR GUILTY ABOUT YOUR DRINKING: NO
HAVE PEOPLE ANNOYED YOU BY CRITICIZING YOUR DRINKING: NO

## 2024-09-13 ASSESSMENT — COLUMBIA-SUICIDE SEVERITY RATING SCALE - C-SSRS
1. IN THE PAST MONTH, HAVE YOU WISHED YOU WERE DEAD OR WISHED YOU COULD GO TO SLEEP AND NOT WAKE UP?: NO
2. HAVE YOU ACTUALLY HAD ANY THOUGHTS OF KILLING YOURSELF?: NO
6. HAVE YOU EVER DONE ANYTHING, STARTED TO DO ANYTHING, OR PREPARED TO DO ANYTHING TO END YOUR LIFE?: NO

## 2024-09-13 ASSESSMENT — PAIN SCALES - GENERAL: PAINLEVEL_OUTOF10: 9

## 2024-09-13 ASSESSMENT — PAIN DESCRIPTION - LOCATION: LOCATION: KNEE

## 2024-09-13 ASSESSMENT — PAIN DESCRIPTION - PAIN TYPE: TYPE: CHRONIC PAIN;ACUTE PAIN

## 2024-09-13 ASSESSMENT — PAIN DESCRIPTION - ORIENTATION: ORIENTATION: RIGHT

## 2024-09-13 NOTE — ED PROVIDER NOTES
HPI   Chief Complaint   Patient presents with    Knee Pain     Patient was here on Sunday got a ultra sound of the right calf and was negative for blood clot. States she is still having pain.        Patient is a 89-year-old female presents emergency department for evaluation of right sided knee and calf pain.  Patient states that the pain started on 9/7 and describes it as a dull aching pain.  She feels like the right side of her calf was mildly swollen or felt tight.  She states that she was seen this past Sunday and had an ultrasound done which was negative for DVT.  She was given medication help with pain which somewhat helps with the pain, but she is persisting to have pain and presents for reevaluation.  She denies any specific injury or trauma that she recalls.  She has been ambulating on it without any difficulty.  She denies any fevers, chills, nausea, vomiting, lightheadedness, dizziness, or other concerns at this time.  She states that after the ER visit she never followed up with anybody else.  She states the pain has not changed in any capacity, but just persist to be there which is why she presents once again.  She denies any previous issues with the knee that she recalls.  She states the pain starts on the right side of the knee and travels down the lateral side of the right lower extremity ending about mid calf.  She denies any pain or swelling in the ankle or foot.  She denies any new onset numbness or tingling.      History provided by:  Patient   used: No            Patient History   Past Medical History:   Diagnosis Date    Lung cancer (Multi)      Past Surgical History:   Procedure Laterality Date    CT ABDOMEN PELVIS ANGIOGRAM W AND/OR WO IV CONTRAST  01/05/2024    CT ABDOMEN PELVIS ANGIOGRAM W AND/OR WO IV CONTRAST 1/5/2024 [facility] CT    OTHER SURGICAL HISTORY      lefdt lung removal     Family History   Problem Relation Name Age of Onset    Cancer Mother       Social History      Tobacco Use    Smoking status: Never    Smokeless tobacco: Never   Substance Use Topics    Alcohol use: Never    Drug use: Never       Physical Exam   ED Triage Vitals [09/13/24 1306]   Temperature Heart Rate Respirations BP   36.9 °C (98.4 °F) 86 18 117/71      Pulse Ox Temp Source Heart Rate Source Patient Position   99 % Oral Monitor Lying      BP Location FiO2 (%)     Right arm --       Physical Exam  Constitutional:       Appearance: Normal appearance.   Cardiovascular:      Rate and Rhythm: Normal rate and regular rhythm.   Pulmonary:      Effort: Pulmonary effort is normal.      Breath sounds: Normal breath sounds.   Musculoskeletal:         General: Tenderness present. Normal range of motion.      Comments: Tenderness palpation over lateral aspect of right knee to mid calf.  No overlying erythema or rashes or warmth.  Right knee with range of motion intact.  Pain with varus stressing over LCL ligament.  No significant knee effusion.  Right lower extremity with good distal pulses.   Skin:     General: Skin is warm and dry.      Comments: Right calf and lower extremity with no significant swelling when compared to left.  No overlying erythema, warmth, rashes, lesions.  Right lower extremity with good distal pulses and good color.   Neurological:      General: No focal deficit present.      Mental Status: She is alert and oriented to person, place, and time.           ED Course & MDM   Diagnoses as of 09/13/24 1620   Arthritis of knee, right                 No data recorded     [place] Coma Scale Score: 15 (09/13/24 1306 : Rama Diez, BECKY)                           Medical Decision Making  Patient is a 89-year-old female presents emergency department for evaluation of right knee and calf pain.    Lab work not warranted at today's visit.    Scans done today were interpreted/confirmed by radiologist and also interpreted by me which included x-ray right knee and x-ray right tibia/fibula.  Right lower  extremity venous duplex from 9/8 reviewed by me interpreted by radiologist which reveals no evidence for DVT.  X-rays done today show prior internal fixation of medial and lateral malleoli with no acute osseous abnormality with arthritis at the knee very severe laterally.    Medications given at today's visit include p.o. Tylenol and ibuprofen    I saw this patient independently.  I did review patient's ultrasound from 9/8 which revealed no evidence for DVT.  Patient's pain is unchanged from prior visit and do not feel she needs any reimaging of ultrasound at this time.  X-ray shows evidence of severe arthritis in the knee specifically worse in the lateral side.  I feel this is likely the source of patient's pain today.  I suspect she likely has a flare of her arthritis that is radiating down to mid calf from the lateral right knee as this were all of her pain is especially worse with varus stressing and tenderness palpation over lateral aspect of knee.  She was educated on continued symptom management including Tylenol and ibuprofen as well as ice, compression with Ace wrap, and elevation.  She is educated to follow-up closely with primary care provider as she would likely benefit from physical therapy.  She given referral for orthopedic provider to follow-up closely with for continued management of arthritis in the knee.  No evidence for significant swelling and patient able to ambulate without any difficulty.  No evidence for superimposed infection.  Patient agreeable plan of discharge at this time.  Emergent pathologies were considered for this patient, although I have low suspicion for anything acutely emergent given patient's clinical presentation, history, physical exam, stable vital signs, and relatively unremarkable workup.  Discharging patient home is reasonable plan of care for outpatient management.    All labs, imaging, and diagnostic studies were reviewed by me and patient was counseled on clinical  impression, expectations, and plan.  Patient was educated to follow-up with PCP in the following 1-2 days.  All questions from patient were answered. They elicited understanding and were agreeable to course of treatment.  Patient was discharged in stable condition and given strict return precautions.    ** Disclaimer:  Parts of this document were written utilizing a voice to text dictation software.  Note may contain minor transcription or typographical errors that were inadvertently transcribed by the computer software.        Procedure  Procedures     Monica Guevara PA-C  09/13/24 6124

## 2024-09-18 ENCOUNTER — APPOINTMENT (OUTPATIENT)
Dept: OTOLARYNGOLOGY | Facility: CLINIC | Age: 89
End: 2024-09-18
Payer: MEDICARE

## 2024-09-18 ENCOUNTER — APPOINTMENT (OUTPATIENT)
Dept: AUDIOLOGY | Facility: CLINIC | Age: 89
End: 2024-09-18
Payer: MEDICARE

## 2024-09-18 DIAGNOSIS — H90.3 SENSORINEURAL HEARING LOSS (SNHL) OF BOTH EARS: ICD-10-CM

## 2024-09-18 DIAGNOSIS — H91.90 DECREASED HEARING, UNSPECIFIED LATERALITY: ICD-10-CM

## 2024-09-18 DIAGNOSIS — H91.13 PRESBYCUSIS OF BOTH EARS: Primary | ICD-10-CM

## 2024-09-18 DIAGNOSIS — U09.9 COVID-19 LONG HAULER MANIFESTING CHRONIC LOSS OF SMELL AND TASTE: ICD-10-CM

## 2024-09-18 DIAGNOSIS — R43.8 COVID-19 LONG HAULER MANIFESTING CHRONIC LOSS OF SMELL AND TASTE: ICD-10-CM

## 2024-09-18 DIAGNOSIS — H90.3 SENSORINEURAL HEARING LOSS (SNHL) OF BOTH EARS: Primary | ICD-10-CM

## 2024-09-18 PROCEDURE — 99213 OFFICE O/P EST LOW 20 MIN: CPT | Performed by: OTOLARYNGOLOGY

## 2024-09-18 PROCEDURE — 92557 COMPREHENSIVE HEARING TEST: CPT | Performed by: AUDIOLOGIST

## 2024-09-18 PROCEDURE — 92550 TYMPANOMETRY & REFLEX THRESH: CPT | Performed by: AUDIOLOGIST

## 2024-09-18 NOTE — PROGRESS NOTES
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 female presenting with: \"Lost my taste and smell, hearing test\".  She is kindly referred by Radha Can MD.    She has decreased sense of smell and taste for the last 3 years, started with covid.     On examination, patient has pale inferior turbinate mucosa. Nasal passages look open bilaterally. No visible postnasal discharge.  Small ~1x1 cm mobil adenopathy at left upper jugular area.    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    Recommendations:  1- smell retraining therapy  2- budesonide rinse for 3 months  3- hearing test for decreased hearing     Past Medical History  She has a past medical history of Lung cancer (Multi).    Surgical History  She has a past surgical history that includes CT angio abdomen pelvis w and or wo IV IV contrast (01/05/2024) and Other surgical history.     Social History  She reports that she has never smoked. She has never used smokeless tobacco. She reports that she does not drink alcohol and does not use drugs.    Family History  Family History   Problem Relation Name Age of Onset    Cancer Mother          Allergies  Patient has no known allergies.    Review of Systems (initial ROS)  Feeling tired  Difficulty hearing  Shortness of breath  Muscle pain  Sleep disturbance     Physical Exam (initial exam note)    General appearance: Healthy-appearing, well-nourished, well groomed, in no acute distress.     Head and Face: Atraumatic with no masses, lesions, or scarring.      Salivary glands: No tenderness of the parotid glands or parotid masses.     No tenderness of the submandibular glands or submandibular masses.      Facial strength: Normal strength and symmetry, no synkinesis or facial tic.     Eyes: Conjunctivas look non-hyperemic " "bilaterally    Ears: Bilaterally wax was cleaned, ear canals look normal. Tympanic membranes look intact, no hyperemia, fluid or retraction.      Nose: Mucosa looks normal. No purulent discharge. Septum essentially straight.     Oral Cavity/Mouth: Lips and tongue look normal.     Throat: No postnasal discharge. No tonsil hypertrophy. No hyperemia.    Neck: Symmetrical, trachea midline.     Pulmonary: Normal respiratory effort.     Lymphatic: No palpable pathologic lymph nodes at neck.     Neurological/Psychiatric Orientation to person, place, and time: Normal.     Mood and affect: Normal.      Extremities: No clubbing.     Skin: No significant skin lesions were noted at face or neck        Procedure    EAR WAX REMOVAL 08.28.2024  Patient had bilateral ear wax. Using small instrument(s) and/or suction cleaning was done. Patient tolerated the procedure well.        Last Recorded Vitals  There were no vitals taken for this visit.    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 female presenting with: \"Lost my taste and smell, hearing test\".  She is kindly referred by Radha Can MD.    She has decreased sense of smell and taste for the last 3 years, started with covid.     On examination, patient has pale inferior turbinate mucosa. Nasal passages look open bilaterally. No visible postnasal discharge.  Small ~1x1 cm mobil adenopathy at left upper jugular area.    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    Recommendations:  1- smell retraining therapy  2- budesonide rinse for 3 months  3- hearing test for decreased hearing      Francine Blue  Otolaryngology - Head & Neck Surgery  "

## 2024-09-18 NOTE — PROGRESS NOTES
AUDIOLOGY ADULT AUDIOMETRIC EVALUATION    Name:  Kathy Tucker  :  1934  Age:  89 y.o.  Date of Evaluation:  2024    Reason for visit: Ms. Tucker is seen in the clinic today at the request of otolaryngology for an audiologic evaluation.     HISTORY  Patient complains of LOSS OF SMELL AND TASTE AFTER COVID  and long term hearing loss.  The last audiogram was in 2015 and today's audiogram is progressive. She denies tinnitus , dizziness and fullness. She is having difficulty hearing in general and for television. We discussed hearing aids and where to go for them.    EVALUATION  See scanned audiogram: “Media” > “Audiology Report”.      RESULTS  Otoscopic Evaluation:  Right Ear: clear ear canal  Left Ear: clear ear canal    Immittance Measures:  Tympanometry:  Right Ear: Type A, normal tympanic membrane mobility with normal middle ear pressure   Left Ear: Type A, normal tympanic membrane mobility with normal middle ear pressure     Acoustic Reflexes:  Ipsilateral Right Ear:  NR NR NR - 4 K HZ  Ipsilateral Left Ear:    NR NR NR - 4 K HZ  Contralateral Right Ear: did not evaluate  Contralateral Left Ear: did not evaluate    Distortion Product Otoacoustic Emissions (DPOAEs):  Right Ear: REFER: 1-8 K HZ  Left Ear:    REFER 1-8 K HZ    Audiometry:  Test Technique and Reliability: BEHAVIORAL   Standard audiometry via supra-aural headphones. Reliability is  mostly good.    Pure tone air and bone conduction audiometry:  Right Ear:   MILD MODERATELY SEVERE SNHL WORSENED FROM 2015.  Left Ear:  MILD MODERATELY SEVERE SNHL WORSENED FROM     Speech Audiometry (Word Recognition Scores):   Right Ear:  76 % FAIR   Left Ear:    76% FAIR    IMPRESSIONS    MILD MODERATE SEVERE SNHL WITH FAIR WRS AND LOSS OF TASTE AND SMELL AFTER COVID.  The presence of acoustic reflexes within normal intensity limits is consistent with normal middle ear and brainstem function, and suggests that auditory sensitivity is  not significantly impaired. An elevated or absent acoustic reflex threshold is consistent with a middle ear disorder, hearing loss in the stimulated ear, and/or interruption of neural innervation of the stapedius muscle. Present DPOAEs suggest normal/near normal cochlear outer hair cell function and are consistent with no greater than a mild hearing loss at those frequencies. Absent DPOAEs are consistent with abnormal cochlear outer hair cell function and some degree of hearing loss at those frequencies.    RECOMMENDATIONS  - Follow up with otolaryngology today as scheduled.  - Audiologic evaluation as needed.  - Annual audiologic evaluation, sooner if an acute change is noted.  - Audiologic evaluation in conjunction with otologic care, if an acute change is noted, and/or annually.  - Consider hearing aids.  DISCUSSED AND GAVE SOME OPTIONS FOR HEARING AID PURCHASE.Contact insurance to determine if there is an applicable benefit and where it can be used. Contact our office to schedule an appointment should she wish to proceed with hearing aids through our clinic.  - Follow-up with medical care team as planned.    PATIENT EDUCATION  Discussed results, impressions and recommendations with the patient. Questions were addressed and the patient was encouraged to contact our office should concerns arise.    Time for this encounter: 35 MINUTES    Robert Hartman  Licensed Audiologist

## 2024-10-08 ENCOUNTER — OFFICE VISIT (OUTPATIENT)
Dept: PRIMARY CARE | Facility: CLINIC | Age: 89
End: 2024-10-08
Payer: MEDICARE

## 2024-10-08 ENCOUNTER — LAB (OUTPATIENT)
Dept: LAB | Facility: LAB | Age: 89
End: 2024-10-08
Payer: MEDICARE

## 2024-10-08 VITALS
HEART RATE: 67 BPM | HEIGHT: 59 IN | SYSTOLIC BLOOD PRESSURE: 100 MMHG | WEIGHT: 107 LBS | DIASTOLIC BLOOD PRESSURE: 70 MMHG | BODY MASS INDEX: 21.57 KG/M2 | OXYGEN SATURATION: 96 % | TEMPERATURE: 96.9 F

## 2024-10-08 DIAGNOSIS — R94.31 ABNORMAL EKG: ICD-10-CM

## 2024-10-08 DIAGNOSIS — Z00.00 MEDICARE ANNUAL WELLNESS VISIT, SUBSEQUENT: Primary | ICD-10-CM

## 2024-10-08 DIAGNOSIS — Z78.0 ASYMPTOMATIC MENOPAUSAL STATE: ICD-10-CM

## 2024-10-08 DIAGNOSIS — E55.9 VITAMIN D DEFICIENCY: ICD-10-CM

## 2024-10-08 DIAGNOSIS — Z00.00 ANNUAL PHYSICAL EXAM: ICD-10-CM

## 2024-10-08 DIAGNOSIS — Z71.85 VACCINE COUNSELING: ICD-10-CM

## 2024-10-08 DIAGNOSIS — Z12.31 ENCOUNTER FOR SCREENING MAMMOGRAM FOR MALIGNANT NEOPLASM OF BREAST: ICD-10-CM

## 2024-10-08 DIAGNOSIS — Z13.6 SCREENING FOR CARDIOVASCULAR CONDITION: ICD-10-CM

## 2024-10-08 DIAGNOSIS — R07.9 INTERMITTENT CHEST PAIN: ICD-10-CM

## 2024-10-08 DIAGNOSIS — J44.9 CHRONIC OBSTRUCTIVE PULMONARY DISEASE, UNSPECIFIED COPD TYPE (MULTI): ICD-10-CM

## 2024-10-08 DIAGNOSIS — Z23 ENCOUNTER FOR IMMUNIZATION: ICD-10-CM

## 2024-10-08 DIAGNOSIS — H40.9 GLAUCOMA, UNSPECIFIED GLAUCOMA TYPE, UNSPECIFIED LATERALITY: ICD-10-CM

## 2024-10-08 LAB
25(OH)D3 SERPL-MCNC: 56 NG/ML (ref 30–100)
ALBUMIN SERPL BCP-MCNC: 3.9 G/DL (ref 3.4–5)
ALP SERPL-CCNC: 60 U/L (ref 33–136)
ALT SERPL W P-5'-P-CCNC: 19 U/L (ref 7–45)
ANION GAP SERPL CALCULATED.3IONS-SCNC: 9 MMOL/L (ref 10–20)
AST SERPL W P-5'-P-CCNC: 18 U/L (ref 9–39)
BILIRUB SERPL-MCNC: 0.6 MG/DL (ref 0–1.2)
BUN SERPL-MCNC: 13 MG/DL (ref 6–23)
CALCIUM SERPL-MCNC: 9.2 MG/DL (ref 8.6–10.3)
CHLORIDE SERPL-SCNC: 106 MMOL/L (ref 98–107)
CHOLEST SERPL-MCNC: 202 MG/DL (ref 0–199)
CHOLEST/HDLC SERPL: 2.9 {RATIO}
CO2 SERPL-SCNC: 30 MMOL/L (ref 21–32)
CREAT SERPL-MCNC: 0.75 MG/DL (ref 0.5–1.05)
EGFRCR SERPLBLD CKD-EPI 2021: 76 ML/MIN/1.73M*2
ERYTHROCYTE [DISTWIDTH] IN BLOOD BY AUTOMATED COUNT: 13 % (ref 11.5–14.5)
GLUCOSE SERPL-MCNC: 93 MG/DL (ref 74–99)
HCT VFR BLD AUTO: 39.4 % (ref 36–46)
HDLC SERPL-MCNC: 68.6 MG/DL
HGB BLD-MCNC: 13.1 G/DL (ref 12–16)
LDLC SERPL CALC-MCNC: 124 MG/DL
MCH RBC QN AUTO: 30.5 PG (ref 26–34)
MCHC RBC AUTO-ENTMCNC: 33.2 G/DL (ref 32–36)
MCV RBC AUTO: 92 FL (ref 80–100)
NON HDL CHOLESTEROL: 133 MG/DL (ref 0–149)
NRBC BLD-RTO: 0 /100 WBCS (ref 0–0)
PLATELET # BLD AUTO: 173 X10*3/UL (ref 150–450)
POTASSIUM SERPL-SCNC: 4.5 MMOL/L (ref 3.5–5.3)
PROT SERPL-MCNC: 6.1 G/DL (ref 6.4–8.2)
RBC # BLD AUTO: 4.29 X10*6/UL (ref 4–5.2)
SODIUM SERPL-SCNC: 140 MMOL/L (ref 136–145)
TRIGL SERPL-MCNC: 49 MG/DL (ref 0–149)
TSH SERPL-ACNC: 1.64 MIU/L (ref 0.44–3.98)
VLDL: 10 MG/DL (ref 0–40)
WBC # BLD AUTO: 3.9 X10*3/UL (ref 4.4–11.3)

## 2024-10-08 PROCEDURE — 99215 OFFICE O/P EST HI 40 MIN: CPT | Performed by: STUDENT IN AN ORGANIZED HEALTH CARE EDUCATION/TRAINING PROGRAM

## 2024-10-08 PROCEDURE — 1036F TOBACCO NON-USER: CPT | Performed by: STUDENT IN AN ORGANIZED HEALTH CARE EDUCATION/TRAINING PROGRAM

## 2024-10-08 PROCEDURE — 36415 COLL VENOUS BLD VENIPUNCTURE: CPT

## 2024-10-08 PROCEDURE — 1159F MED LIST DOCD IN RCRD: CPT | Performed by: STUDENT IN AN ORGANIZED HEALTH CARE EDUCATION/TRAINING PROGRAM

## 2024-10-08 PROCEDURE — 82306 VITAMIN D 25 HYDROXY: CPT

## 2024-10-08 PROCEDURE — G0439 PPPS, SUBSEQ VISIT: HCPCS | Performed by: STUDENT IN AN ORGANIZED HEALTH CARE EDUCATION/TRAINING PROGRAM

## 2024-10-08 PROCEDURE — 93010 ELECTROCARDIOGRAM REPORT: CPT | Performed by: STUDENT IN AN ORGANIZED HEALTH CARE EDUCATION/TRAINING PROGRAM

## 2024-10-08 PROCEDURE — G0008 ADMIN INFLUENZA VIRUS VAC: HCPCS | Performed by: STUDENT IN AN ORGANIZED HEALTH CARE EDUCATION/TRAINING PROGRAM

## 2024-10-08 PROCEDURE — G0009 ADMIN PNEUMOCOCCAL VACCINE: HCPCS | Performed by: STUDENT IN AN ORGANIZED HEALTH CARE EDUCATION/TRAINING PROGRAM

## 2024-10-08 PROCEDURE — 1126F AMNT PAIN NOTED NONE PRSNT: CPT | Performed by: STUDENT IN AN ORGANIZED HEALTH CARE EDUCATION/TRAINING PROGRAM

## 2024-10-08 PROCEDURE — 99214 OFFICE O/P EST MOD 30 MIN: CPT | Performed by: STUDENT IN AN ORGANIZED HEALTH CARE EDUCATION/TRAINING PROGRAM

## 2024-10-08 PROCEDURE — 1124F ACP DISCUSS-NO DSCNMKR DOCD: CPT | Performed by: STUDENT IN AN ORGANIZED HEALTH CARE EDUCATION/TRAINING PROGRAM

## 2024-10-08 PROCEDURE — 93005 ELECTROCARDIOGRAM TRACING: CPT | Performed by: STUDENT IN AN ORGANIZED HEALTH CARE EDUCATION/TRAINING PROGRAM

## 2024-10-08 ASSESSMENT — ANXIETY QUESTIONNAIRES
IF YOU CHECKED OFF ANY PROBLEMS ON THIS QUESTIONNAIRE, HOW DIFFICULT HAVE THESE PROBLEMS MADE IT FOR YOU TO DO YOUR WORK, TAKE CARE OF THINGS AT HOME, OR GET ALONG WITH OTHER PEOPLE: NOT DIFFICULT AT ALL
4. TROUBLE RELAXING: NOT AT ALL
1. FEELING NERVOUS, ANXIOUS, OR ON EDGE: NOT AT ALL
2. NOT BEING ABLE TO STOP OR CONTROL WORRYING: NOT AT ALL
5. BEING SO RESTLESS THAT IT IS HARD TO SIT STILL: NOT AT ALL
GAD7 TOTAL SCORE: 0
3. WORRYING TOO MUCH ABOUT DIFFERENT THINGS: NOT AT ALL
6. BECOMING EASILY ANNOYED OR IRRITABLE: NOT AT ALL
7. FEELING AFRAID AS IF SOMETHING AWFUL MIGHT HAPPEN: NOT AT ALL

## 2024-10-08 ASSESSMENT — PATIENT HEALTH QUESTIONNAIRE - PHQ9
7. TROUBLE CONCENTRATING ON THINGS, SUCH AS READING THE NEWSPAPER OR WATCHING TELEVISION: NOT AT ALL
2. FEELING DOWN, DEPRESSED OR HOPELESS: NOT AT ALL
4. FEELING TIRED OR HAVING LITTLE ENERGY: NOT AT ALL
SUM OF ALL RESPONSES TO PHQ9 QUESTIONS 1 AND 2: 0
1. LITTLE INTEREST OR PLEASURE IN DOING THINGS: NOT AT ALL
SUM OF ALL RESPONSES TO PHQ QUESTIONS 1-9: 0
5. POOR APPETITE OR OVEREATING: NOT AT ALL
6. FEELING BAD ABOUT YOURSELF - OR THAT YOU ARE A FAILURE OR HAVE LET YOURSELF OR YOUR FAMILY DOWN: NOT AT ALL
9. THOUGHTS THAT YOU WOULD BE BETTER OFF DEAD, OR OF HURTING YOURSELF: NOT AT ALL
8. MOVING OR SPEAKING SO SLOWLY THAT OTHER PEOPLE COULD HAVE NOTICED. OR THE OPPOSITE, BEING SO FIGETY OR RESTLESS THAT YOU HAVE BEEN MOVING AROUND A LOT MORE THAN USUAL: NOT AT ALL
3. TROUBLE FALLING OR STAYING ASLEEP OR SLEEPING TOO MUCH: NOT AT ALL

## 2024-10-08 ASSESSMENT — PAIN SCALES - GENERAL: PAINLEVEL: 0-NO PAIN

## 2024-10-08 NOTE — PROGRESS NOTES
MEDICARE WELLNESS    Chief Complaint   Patient presents with    Annual Exam       HPI:  Kathy Tucker is a 89 y.o. female here  for a a Medicare Wellness Visit.    COPD:  Currently well-controlled.  Managed with as needed albuterol.    Glaucoma:  Follows with ophthalmology ([place])    Intermittent Chest Pain:  No current pain.  States that she has had mild intermittent discomfort for the past 6 months.  Does not currently see a cardiologist.    Health Maintenance    Other Health Care Providers:  Medical record reviewed and discussed with patient.  ENT:  [facility]  Ophthalmology:  Kosta Clay MD, PhD (previously saw Dr. Lincoln)  GI:  [facility]    Social History:  Tobacco:  never  EtOH:  not currently  Patient reports routine vision checks and dental cleanings, and exercise/physical acivity as tolerated.     Family History:  Family History   Problem Relation Name Age of Onset    Cancer Mother         Advanced Directives:  Patient DOES have advanced directives in place, will bring a copy to clinic.  Counseled and discussed importance with patient during visit today.  Provided assistance as necessary.    Opioid Medications:  Does the patient use opioid medications: NO  Name of medication: N/A  If yes, do they take this medicine appropriately: N/A    Overall Health:  How does the patient rate their health status today:  GOOD    Cognitive Screen:  AAAx3  to person, place and time: YES  3 word recall: Banana, Chair, Sunrise  - Immediate recall: YES  - 5 minutes recall: YES (2/3)  Impression: No cognitive deficiency observed during screening or encounter today     Vision/Hearing Screen:  Vision:  No acute vision concerns at visit today.  Hearing screen: reports no difficulty with hearing and passes finger rub test bilaterally    Immunizations:  COVID:  DUE  Flu:  DUE  Tdap: utd  Pneumonia:  PREVNAR 20 DUE  Shingrix:  s/p Zostavax 10/11/2012    Immunization History   Administered Date(s) Administered    Flu vaccine (IIV4),  preservative free *Check age/dose* 10/01/2021    Flu vaccine, quadrivalent, high-dose, preservative free, age 65y+ (FLUZONE) 11/10/2022, 10/02/2023    Flu vaccine, trivalent, preservative free, HIGH-DOSE, age 65y+ (Fluzone) 10/08/2024    Flu vaccine, trivalent, preservative free, age 6 months and greater (Fluarix/Fluzone/Flulaval) 10/05/2015, 10/10/2016    Influenza, injectable, quadrivalent 10/15/2018, 10/28/2019    Influenza, seasonal, injectable 10/12/2010, 10/15/2014    Moderna COVID-19 vaccine, 12 years and older (50mcg/0.5mL)(Spikevax) 10/30/2023    Moderna SARS-CoV-2 Vaccination 03/10/2021, 04/09/2021, 11/17/2021, 05/20/2022    Pneumococcal conjugate vaccine, 13-valent (PREVNAR 13) 04/14/2015    Pneumococcal conjugate vaccine, 20-valent (PREVNAR 20) 10/08/2024    Pneumococcal polysaccharide vaccine, 23-valent, age 2 years and older (PNEUMOVAX 23) 10/07/1992, 01/05/2001, 01/25/2001, 04/09/2008, 11/08/2021    Tdap vaccine, age 7 year and older (BOOSTRIX, ADACEL) 04/14/2015    Zoster, live 10/11/2012, 11/08/2013       Screenings:  Mammogram:  3/27/2023 wnl - DUE  Colonoscopy:  Referral entered from Dr. Dobbins earlier this year.  DEXA:  12/2/2022 showing osteopenia - due 12/2/2024  Lung:  not currently indicated    Reviewed:   Past Medical History/Allergies:  Yes  Family History:  Yes  Social History:  Yes  Current Medications:  Yes  Vital Signs:  Yes  Advanced Directives:  discussed  Immunizations:  reviewed today  Home Safety:                    Up & Go test > 30 seconds?  No                   Home have rugs; lack grab bars in bathroom; lack handrail on stairs; have poor lighting?  No                   Hearing difficulties?  No  Geriatric Assessment           ADL areas requiring assistance:  Does not need help with , Dressing, Eating, Ambulating, Toileting, Grooming, Hygiene.            IADL areas requiring assistance:  Does not need help with , Shopping, Housework, Accounting, Transportation, Driving.    Medications reviewed           Current supplements  Reviewed and recorded.     PHQ9/GAD7:  Over the past 2 weeks, how often have you been bothered by any of the following problems?  Trouble falling or staying asleep, or sleeping too much: Not at all  Feeling tired or having little energy: Not at all  Poor appetite or overeating: Not at all  Feeling bad about yourself - or that you are a failure or have let yourself or your family down: Not at all  Trouble concentrating on things, such as reading the newspaper or watching television: Not at all  Moving or speaking so slowly that other people could have noticed? Or the opposite - being so fidgety or restless that you have been moving around a lot more than usual.: Not at all  Thoughts that you would be better off dead or hurting yourself in some way: Not at all  Patient Health Questionnaire-9 Score: 0  Over the last 2 weeks, how often have you been bothered by any of the following problems?  Feeling nervous, anxious, or on edge: Not at all  Not being able to stop or control worrying: Not at all  Worrying too much about different things: Not at all  Trouble relaxing: Not at all  Being so restless that it is hard to sit still: Not at all  Becoming easily annoyed or irritable: Not at all  Feeling afraid as if something awful might happen: Not at all  NABEEL-7 Total Score: 0      Current Medications  Current Outpatient Medications   Medication Instructions    albuterol 90 mcg/actuation inhaler 2 puffs, inhalation, Every 4 hours PRN    aspirin 81 mg, oral, Daily    budesonide (Pulmicort) 0.5 mg/2 mL nebulizer solution Add one vial to neilmed sinus rinse bottle and rinse your nose with the solution daily    cholecalciferol (VITAMIN D-3) 2,000 Units, oral, Daily    latanoprost (Xalatan) 0.005 % ophthalmic solution 1 drop, Both Eyes, Daily    omega 3-dha-epa-fish oil 1,000 mg (120 mg-180 mg) capsule 1,000 mg, oral, Every 24 hours        History  No Known Allergies   Past  Medical History:   Diagnosis Date    Lung cancer (Multi)       Past Surgical History:   Procedure Laterality Date    CT ABDOMEN PELVIS ANGIOGRAM W AND/OR WO IV CONTRAST  01/05/2024    CT ABDOMEN PELVIS ANGIOGRAM W AND/OR WO IV CONTRAST 1/5/2024 [facility] CT    OTHER SURGICAL HISTORY      lefdt lung removal     Family History   Problem Relation Name Age of Onset    Cancer Mother       Social History     Tobacco Use    Smoking status: Never    Smokeless tobacco: Never   Substance Use Topics    Alcohol use: Never    Drug use: Never     Tobacco Use: Low Risk  (10/8/2024)    Patient History     Smoking Tobacco Use: Never     Smokeless Tobacco Use: Never     Passive Exposure: Not on file        ROS  All pertinent positive symptoms are included in the history of present illness.  All other systems have been reviewed and are negative and noncontributory to this patient's current ailments.    VITAL SIGNS  Vitals:    10/08/24 0807   BP: 100/70   Pulse: 67   Temp: 36.1 °C (96.9 °F)   SpO2: 96%     Vitals:    10/08/24 0807   Weight: 48.5 kg (107 lb)      Body mass index is 21.61 kg/m².     PHYSICAL EXAM    GENERAL  Well-appearing, pleasant and cooperative.  No acute distress.    HEENT  HEAD:   Normocephalic.  Atraumatic.  EYES:  PERRLA.  No scleral icterus or conjunctival injection.  EARS:  Tympanic membranes visualized bilaterally without erythema, fluid, or bulging.  NECK:  No adenopathy.  No palpable thyroid enlargement or nodules.    THROAT:  Moist oropharynx without tonsillar enlargement or exudates.    LUNGS:    Clear to auscultation bilaterally.  No wheezes, rales, rhonchi.    CARDIAC:  Regular rate and rhythm.  Normal S1S2.  No murmurs/rubs/gallops.    ABDOMEN:  Soft, non-tender, non-distended.  No hepatosplenomegaly.  Normoactive bowel sounds.    MUSCULOSKELETAL:  No gross abnormalities.   No joint swelling or erythema,.  No spinal or paraspinal tenderness to palpation.    EXTREMITIES:  No LE edema or cyanosis.      NEURO            Alert and oriented x3. No focal deficits.    PSYCH:          Affect appropriate.     Preventative Services reviewed with patient, instructions provided    Assessment/Plan   Problem List Items Addressed This Visit    None  Visit Diagnoses       Medicare annual wellness visit, subsequent    -  Primary    Relevant Orders    ECG 12 lead (Clinic Performed) (Completed)    Vitamin D deficiency        Relevant Orders    Vitamin D 25-Hydroxy,Total (for eval of Vitamin D levels)    Annual physical exam        Relevant Orders    TSH with reflex to Free T4 if abnormal    Lipid Panel    CBC    Comprehensive Metabolic Panel    Screening for cardiovascular condition        Relevant Orders    Lipid Panel    Encounter for screening mammogram for malignant neoplasm of breast        Relevant Orders    BI mammo bilateral screening tomosynthesis    Asymptomatic menopausal state        Relevant Orders    XR DEXA bone density    Vaccine counseling        Relevant Orders    Flu vaccine, trivalent, preservative free, HIGH-DOSE, age 65y+ (Fluzone) (Completed)    Pneumococcal conjugate vaccine, 20-valent (PREVNAR 20) (Completed)    Encounter for immunization        Relevant Orders    Flu vaccine, trivalent, preservative free, HIGH-DOSE, age 65y+ (Fluzone) (Completed)    Pneumococcal conjugate vaccine, 20-valent (PREVNAR 20) (Completed)    Intermittent chest pain        EKG with rightward axis, sinus rhythm with PAC.  No acute ST/interval changes.  Cardiology referral entered today.    Relevant Orders    CBC    Comprehensive Metabolic Panel    Referral to Cardiology    Abnormal EKG        Relevant Orders    CBC    Comprehensive Metabolic Panel    Referral to Cardiology                 Radha Can MD\

## 2024-10-08 NOTE — PATIENT INSTRUCTIONS
Thank you for coming to see me today.    Flu and pneumonia vaccinations in clinic today.  I also recommend you get the updated COVID vaccination, this is available at most local pharmacies.    Go to the lab for fasting blood work, we will call you with all results.    Cardiology referral entered today, call to schedule.  Call 911/go to the emergency department for severe chest pain associated with shortness of breath.    Mammogram order entered today, this can be scheduled at the  on your way out or the scheduling number provided.    DEXA scan (bone density) ordered for completion on 12/2/2024 or after, this can be scheduled at the  or with scheduling number provided.    Routine follow-up in 6 months, as needed for acute concerns.

## 2024-11-04 ENCOUNTER — HOSPITAL ENCOUNTER (OUTPATIENT)
Dept: RADIOLOGY | Facility: HOSPITAL | Age: 89
Discharge: HOME | End: 2024-11-04
Payer: MEDICARE

## 2024-11-04 VITALS — BODY MASS INDEX: 21.57 KG/M2 | WEIGHT: 107 LBS | HEIGHT: 59 IN

## 2024-11-04 DIAGNOSIS — Z12.31 ENCOUNTER FOR SCREENING MAMMOGRAM FOR MALIGNANT NEOPLASM OF BREAST: ICD-10-CM

## 2024-11-04 PROCEDURE — 77067 SCR MAMMO BI INCL CAD: CPT

## 2024-11-04 PROCEDURE — 77063 BREAST TOMOSYNTHESIS BI: CPT | Performed by: RADIOLOGY

## 2024-11-04 PROCEDURE — 77067 SCR MAMMO BI INCL CAD: CPT | Performed by: RADIOLOGY

## 2024-12-02 ENCOUNTER — HOSPITAL ENCOUNTER (OUTPATIENT)
Dept: RADIOLOGY | Facility: HOSPITAL | Age: 89
Discharge: HOME | End: 2024-12-02
Payer: MEDICARE

## 2024-12-02 DIAGNOSIS — Z78.0 ASYMPTOMATIC MENOPAUSAL STATE: ICD-10-CM

## 2024-12-02 DIAGNOSIS — M81.0 AGE RELATED OSTEOPOROSIS, UNSPECIFIED PATHOLOGICAL FRACTURE PRESENCE: Primary | ICD-10-CM

## 2024-12-02 PROCEDURE — 77080 DXA BONE DENSITY AXIAL: CPT

## 2024-12-02 PROCEDURE — 77080 DXA BONE DENSITY AXIAL: CPT | Performed by: RADIOLOGY

## 2024-12-06 ENCOUNTER — APPOINTMENT (OUTPATIENT)
Dept: PRIMARY CARE | Facility: CLINIC | Age: 89
End: 2024-12-06
Payer: MEDICARE

## 2024-12-27 ENCOUNTER — APPOINTMENT (OUTPATIENT)
Dept: PRIMARY CARE | Facility: CLINIC | Age: 89
End: 2024-12-27
Payer: MEDICARE

## 2025-02-12 ENCOUNTER — APPOINTMENT (OUTPATIENT)
Dept: RHEUMATOLOGY | Facility: CLINIC | Age: OVER 89
End: 2025-02-12
Payer: MEDICARE

## 2025-04-24 ENCOUNTER — OFFICE VISIT (OUTPATIENT)
Dept: PRIMARY CARE | Facility: CLINIC | Age: OVER 89
End: 2025-04-24
Payer: MEDICARE

## 2025-04-24 VITALS
BODY MASS INDEX: 21.77 KG/M2 | SYSTOLIC BLOOD PRESSURE: 100 MMHG | DIASTOLIC BLOOD PRESSURE: 62 MMHG | HEART RATE: 84 BPM | TEMPERATURE: 97.3 F | WEIGHT: 108 LBS | OXYGEN SATURATION: 98 % | HEIGHT: 59 IN

## 2025-04-24 DIAGNOSIS — R07.9 INTERMITTENT CHEST PAIN: ICD-10-CM

## 2025-04-24 DIAGNOSIS — I95.9 HYPOTENSION, UNSPECIFIED HYPOTENSION TYPE: ICD-10-CM

## 2025-04-24 DIAGNOSIS — R42 DIZZY SPELLS: Primary | ICD-10-CM

## 2025-04-24 DIAGNOSIS — R51.9 NONINTRACTABLE HEADACHE, UNSPECIFIED CHRONICITY PATTERN, UNSPECIFIED HEADACHE TYPE: ICD-10-CM

## 2025-04-24 PROCEDURE — 99214 OFFICE O/P EST MOD 30 MIN: CPT | Performed by: STUDENT IN AN ORGANIZED HEALTH CARE EDUCATION/TRAINING PROGRAM

## 2025-04-24 PROCEDURE — 93005 ELECTROCARDIOGRAM TRACING: CPT | Performed by: STUDENT IN AN ORGANIZED HEALTH CARE EDUCATION/TRAINING PROGRAM

## 2025-04-24 PROCEDURE — 99214 OFFICE O/P EST MOD 30 MIN: CPT | Mod: 25 | Performed by: STUDENT IN AN ORGANIZED HEALTH CARE EDUCATION/TRAINING PROGRAM

## 2025-04-24 PROCEDURE — G2211 COMPLEX E/M VISIT ADD ON: HCPCS | Performed by: STUDENT IN AN ORGANIZED HEALTH CARE EDUCATION/TRAINING PROGRAM

## 2025-04-24 PROCEDURE — 1159F MED LIST DOCD IN RCRD: CPT | Performed by: STUDENT IN AN ORGANIZED HEALTH CARE EDUCATION/TRAINING PROGRAM

## 2025-04-24 PROCEDURE — 93010 ELECTROCARDIOGRAM REPORT: CPT | Performed by: STUDENT IN AN ORGANIZED HEALTH CARE EDUCATION/TRAINING PROGRAM

## 2025-04-24 PROCEDURE — 1125F AMNT PAIN NOTED PAIN PRSNT: CPT | Performed by: STUDENT IN AN ORGANIZED HEALTH CARE EDUCATION/TRAINING PROGRAM

## 2025-04-24 ASSESSMENT — PAIN SCALES - GENERAL: PAINLEVEL_OUTOF10: 2

## 2025-04-24 NOTE — PATIENT INSTRUCTIONS
Thank you for coming to see me today.    New cardiology referral entered today, please reschedule canceled appointment.  -Call 911/go to the ED for severe chest pain/pressure/palpitations or difficulty breathing.    Blood work ordered today, go to the lab -does not need to be fasting.    Brain MRI ordered today for further evaluation of headache/dizziness symptoms, call to schedule.  Due to contrast use, this needs to be done within 30 days of blood work.    Follow-up in October for Medicare Wellness Visit, otherwise pending imaging results.

## 2025-04-24 NOTE — PROGRESS NOTES
"Subjective   Kathy Tucker is a 89 y.o. female who presents for sob, chest pain,dizzy spells.    HPI:    ***    Wakes up at night.      Dizziness, felt    ROS:   Review of systems is essentially negative for all systems except for any identified issues in HPI above.    Objective     BP 92/62   Pulse 77   Temp 36.3 °C (97.3 °F)   Ht 1.499 m (4' 11\")   Wt 49 kg (108 lb)   SpO2 98%   BMI 21.81 kg/m²      PHYSICAL EXAM    GENERAL  Well-appearing, pleasant and cooperative.  No acute distress.    HEENT  HEAD:   Normocephalic.  Atraumatic.  EYES:  PERRLA.  No scleral icterus or conjunctival injection.  EARS:  Tympanic membranes visualized bilaterally without erythema, fluid, or bulging.  NECK:  No adenopathy.  No palpable thyroid enlargement or nodules.    THROAT:  Moist oropharynx without tonsillar enlargement or exudates.    LUNGS:    Clear to auscultation bilaterally.  No wheezes, rales, rhonchi.    CARDIAC:  Regular rate and rhythm.  Normal S1S2.  No murmurs/rubs/gallops.    ABDOMEN:  Soft, non-tender, non-distended.  No hepatosplenomegaly.  Normoactive bowel sounds.    MUSCULOSKELETAL:  No gross abnormalities.   No joint swelling or erythema,.  No spinal or paraspinal tenderness to palpation.    EXTREMITIES:  No LE edema or cyanosis.      NEURO           Alert and oriented x3. No focal deficits.    PSYCH:          Affect appropriate.           Assessment/Plan   Problem List Items Addressed This Visit    None           Radha Can MD    " reviewed.    Relevant Orders    ECG 12 lead (Clinic Performed) (Completed)    Referral to Cardiology      Hypotension, unspecified hypotension type        Relevant Orders    Referral to Cardiology      Nonintractable headache, unspecified chronicity pattern, unspecified headache type        Relevant Orders    Comprehensive metabolic panel (Completed)    MR brain w IV contrast          Patient Instructions   Thank you for coming to see me today.    New cardiology referral entered today, please reschedule canceled appointment.  -Call 911/go to the ED for severe chest pain/pressure/palpitations or difficulty breathing.    Blood work ordered today, go to the lab -does not need to be fasting.    Brain MRI ordered today for further evaluation of headache/dizziness symptoms, call to schedule.  Due to contrast use, this needs to be done within 30 days of blood work.    Follow-up in October for Medicare Wellness Visit, otherwise pending imaging results.    Counseling:   Medication education:    Education: The patient is counseled regarding potential side effects of all new medications.    Understanding:  Patient expressed understanding.    Adherence:  No barriers to adherence identified.      Radha Can MD

## 2025-04-25 LAB
ALBUMIN SERPL-MCNC: 3.8 G/DL (ref 3.6–5.1)
ALP SERPL-CCNC: 46 U/L (ref 37–153)
ALT SERPL-CCNC: 16 U/L (ref 6–29)
ANION GAP SERPL CALCULATED.4IONS-SCNC: 8 MMOL/L (CALC) (ref 7–17)
AST SERPL-CCNC: 19 U/L (ref 10–35)
BASOPHILS # BLD AUTO: 48 CELLS/UL (ref 0–200)
BASOPHILS NFR BLD AUTO: 1.1 %
BILIRUB SERPL-MCNC: 0.6 MG/DL (ref 0.2–1.2)
BUN SERPL-MCNC: 14 MG/DL (ref 7–25)
CALCIUM SERPL-MCNC: 9.2 MG/DL (ref 8.6–10.4)
CHLORIDE SERPL-SCNC: 106 MMOL/L (ref 98–110)
CO2 SERPL-SCNC: 26 MMOL/L (ref 20–32)
CREAT SERPL-MCNC: 0.83 MG/DL (ref 0.6–0.95)
EGFRCR SERPLBLD CKD-EPI 2021: 67 ML/MIN/1.73M2
EOSINOPHIL # BLD AUTO: 180 CELLS/UL (ref 15–500)
EOSINOPHIL NFR BLD AUTO: 4.1 %
ERYTHROCYTE [DISTWIDTH] IN BLOOD BY AUTOMATED COUNT: 11.4 % (ref 11–15)
GLUCOSE SERPL-MCNC: 84 MG/DL (ref 65–99)
HCT VFR BLD AUTO: 40.5 % (ref 35–45)
HGB BLD-MCNC: 13.5 G/DL (ref 11.7–15.5)
LYMPHOCYTES # BLD AUTO: 1712 CELLS/UL (ref 850–3900)
LYMPHOCYTES NFR BLD AUTO: 38.9 %
MCH RBC QN AUTO: 31.4 PG (ref 27–33)
MCHC RBC AUTO-ENTMCNC: 33.3 G/DL (ref 32–36)
MCV RBC AUTO: 94.2 FL (ref 80–100)
MONOCYTES # BLD AUTO: 343 CELLS/UL (ref 200–950)
MONOCYTES NFR BLD AUTO: 7.8 %
NEUTROPHILS # BLD AUTO: 2116 CELLS/UL (ref 1500–7800)
NEUTROPHILS NFR BLD AUTO: 48.1 %
PLATELET # BLD AUTO: 191 THOUSAND/UL (ref 140–400)
PMV BLD REES-ECKER: 10.2 FL (ref 7.5–12.5)
POTASSIUM SERPL-SCNC: 4.4 MMOL/L (ref 3.5–5.3)
PROT SERPL-MCNC: 6 G/DL (ref 6.1–8.1)
RBC # BLD AUTO: 4.3 MILLION/UL (ref 3.8–5.1)
SODIUM SERPL-SCNC: 140 MMOL/L (ref 135–146)
WBC # BLD AUTO: 4.4 THOUSAND/UL (ref 3.8–10.8)

## 2025-04-29 ENCOUNTER — APPOINTMENT (OUTPATIENT)
Dept: PRIMARY CARE | Facility: CLINIC | Age: OVER 89
End: 2025-04-29
Payer: MEDICARE

## 2025-05-02 ENCOUNTER — OFFICE VISIT (OUTPATIENT)
Facility: CLINIC | Age: OVER 89
End: 2025-05-02
Payer: MEDICARE

## 2025-05-02 VITALS
WEIGHT: 110.5 LBS | HEART RATE: 80 BPM | OXYGEN SATURATION: 100 % | DIASTOLIC BLOOD PRESSURE: 62 MMHG | BODY MASS INDEX: 22.32 KG/M2 | SYSTOLIC BLOOD PRESSURE: 98 MMHG

## 2025-05-02 DIAGNOSIS — R42 DIZZY SPELLS: ICD-10-CM

## 2025-05-02 DIAGNOSIS — R07.9 INTERMITTENT CHEST PAIN: ICD-10-CM

## 2025-05-02 DIAGNOSIS — I95.9 HYPOTENSION, UNSPECIFIED HYPOTENSION TYPE: ICD-10-CM

## 2025-05-02 DIAGNOSIS — M94.0 COSTOCHONDRITIS: Primary | ICD-10-CM

## 2025-05-02 PROCEDURE — 1159F MED LIST DOCD IN RCRD: CPT | Performed by: INTERNAL MEDICINE

## 2025-05-02 PROCEDURE — 1036F TOBACCO NON-USER: CPT | Performed by: INTERNAL MEDICINE

## 2025-05-02 PROCEDURE — 99213 OFFICE O/P EST LOW 20 MIN: CPT | Performed by: INTERNAL MEDICINE

## 2025-05-02 PROCEDURE — 99203 OFFICE O/P NEW LOW 30 MIN: CPT | Performed by: INTERNAL MEDICINE

## 2025-05-02 PROCEDURE — 1126F AMNT PAIN NOTED NONE PRSNT: CPT | Performed by: INTERNAL MEDICINE

## 2025-05-02 RX ORDER — ACETAMINOPHEN 500 MG
500 TABLET ORAL EVERY 6 HOURS PRN
COMMUNITY

## 2025-05-02 ASSESSMENT — ENCOUNTER SYMPTOMS
LOSS OF SENSATION IN FEET: 0
OCCASIONAL FEELINGS OF UNSTEADINESS: 1
DEPRESSION: 0

## 2025-05-02 ASSESSMENT — PAIN SCALES - GENERAL: PAINLEVEL_OUTOF10: 0-NO PAIN

## 2025-05-02 ASSESSMENT — LIFESTYLE VARIABLES: TOTAL SCORE: 0

## 2025-05-02 ASSESSMENT — PATIENT HEALTH QUESTIONNAIRE - PHQ9
2. FEELING DOWN, DEPRESSED OR HOPELESS: NOT AT ALL
SUM OF ALL RESPONSES TO PHQ9 QUESTIONS 1 AND 2: 0
1. LITTLE INTEREST OR PLEASURE IN DOING THINGS: NOT AT ALL

## 2025-05-02 NOTE — PROGRESS NOTES
Subjective      Chief Complaint   Patient presents with    <9>Intermittent chest pain        Third because of chest pain complaints, with an EKG from 4/24/2025 showing sinus rhythm normal axis and intervals and a QTc of 427ms    She describes intermittent chest pain which she has had for months but the symptoms come and go, unrelated to certain physical activity, can occur at rest and last for several minutes and resolve.  It is more in the left side of her parasternal region, and she has a history of left lung resection from previous lung cancer in 1992.    History of previous myocardial infarction, heart failure, significant valvular heart disease, syncope or sustained arrhythmias.    Echocardiogram April 2012 showed normal left ventricular systolic function with left ventricular ejection fraction 55 to 60% and mild mitral and tricuspid regurgitation.         Review of Systems   All other systems reviewed and are negative.       Objective   Physical Exam  Constitutional:       Appearance: Normal appearance.   HENT:      Head: Normocephalic and atraumatic.   Eyes:      Pupils: Pupils are equal, round, and reactive to light.   Cardiovascular:      Rate and Rhythm: Normal rate and regular rhythm.      Pulses: Normal pulses.      Heart sounds: Normal heart sounds.   Pulmonary:      Effort: Pulmonary effort is normal.      Breath sounds: Normal breath sounds.   Abdominal:      General: Abdomen is flat. Bowel sounds are normal.      Palpations: Abdomen is soft.   Musculoskeletal:         General: Normal range of motion.      Cervical back: Normal range of motion.   Skin:     General: Skin is warm and dry.   Neurological:      General: No focal deficit present.   Psychiatric:         Mood and Affect: Mood normal.         Judgment: Judgment normal.          Lab Review:   Not applicable    No problem-specific Assessment & Plan notes found for this encounter.

## 2025-05-02 NOTE — ASSESSMENT & PLAN NOTE
Her chest pain sounds like costochondritis, not clearly exertional, no evidence of ischemia on her EKG.  I suggested an echocardiogram to exclude any structural heart disease and reassess her LV systolic function that we can compare with her 2012 study.  We talked about nuclear stress testing but she would prefer to defer that for now and will follow-up with me in a month after her echocardiogram.

## 2025-05-06 ENCOUNTER — HOSPITAL ENCOUNTER (OUTPATIENT)
Dept: RADIOLOGY | Facility: HOSPITAL | Age: OVER 89
Discharge: HOME | End: 2025-05-06
Payer: MEDICARE

## 2025-05-06 DIAGNOSIS — R42 DIZZY SPELLS: ICD-10-CM

## 2025-05-06 DIAGNOSIS — R51.9 NONINTRACTABLE HEADACHE, UNSPECIFIED CHRONICITY PATTERN, UNSPECIFIED HEADACHE TYPE: ICD-10-CM

## 2025-05-06 PROCEDURE — 70551 MRI BRAIN STEM W/O DYE: CPT

## 2025-05-13 ENCOUNTER — TELEPHONE (OUTPATIENT)
Dept: PRIMARY CARE | Facility: CLINIC | Age: OVER 89
End: 2025-05-13
Payer: MEDICARE

## 2025-05-13 NOTE — TELEPHONE ENCOUNTER
Pt LVM about her MR of brain she had completed recently.     Requesting a call back to give her the results.

## 2025-06-02 ENCOUNTER — HOSPITAL ENCOUNTER (OUTPATIENT)
Dept: CARDIOLOGY | Facility: HOSPITAL | Age: OVER 89
Discharge: HOME | End: 2025-06-02
Payer: MEDICARE

## 2025-06-02 VITALS — SYSTOLIC BLOOD PRESSURE: 98 MMHG | DIASTOLIC BLOOD PRESSURE: 62 MMHG

## 2025-06-02 DIAGNOSIS — M94.0 COSTOCHONDRITIS: ICD-10-CM

## 2025-06-02 DIAGNOSIS — R07.9 INTERMITTENT CHEST PAIN: ICD-10-CM

## 2025-06-02 LAB
AORTIC VALVE MEAN GRADIENT: 12 MMHG
AORTIC VALVE PEAK VELOCITY: 2.49 M/S
AV PEAK GRADIENT: 25 MMHG
AVA (PEAK VEL): 1.88 CM2
AVA (VTI): 1.97 CM2
EJECTION FRACTION APICAL 4 CHAMBER: 69.1
EJECTION FRACTION: 63 %
LEFT ATRIUM VOLUME AREA LENGTH INDEX BSA: 32.3 ML/M2
LEFT VENTRICLE INTERNAL DIMENSION DIASTOLE: 3.39 CM (ref 3.5–6)
LEFT VENTRICULAR OUTFLOW TRACT DIAMETER: 1.96 CM
LV EJECTION FRACTION BIPLANE: 70 %
RIGHT VENTRICLE FREE WALL PEAK S': 11.61 CM/S
RIGHT VENTRICLE PEAK SYSTOLIC PRESSURE: 45 MMHG
TRICUSPID ANNULAR PLANE SYSTOLIC EXCURSION: 1.9 CM

## 2025-06-02 PROCEDURE — 93306 TTE W/DOPPLER COMPLETE: CPT | Performed by: INTERNAL MEDICINE

## 2025-06-02 PROCEDURE — 93306 TTE W/DOPPLER COMPLETE: CPT

## 2025-06-09 ENCOUNTER — TELEPHONE (OUTPATIENT)
Facility: CLINIC | Age: OVER 89
End: 2025-06-09
Payer: MEDICARE

## 2025-06-09 ENCOUNTER — TELEPHONE (OUTPATIENT)
Dept: PRIMARY CARE | Facility: CLINIC | Age: OVER 89
End: 2025-06-09
Payer: MEDICARE

## 2025-06-09 NOTE — TELEPHONE ENCOUNTER
She did ECHO 6/2 asking for results - She should call Dr Ng's office (they did it) and results not yet in Epic for us to review

## 2025-06-11 ENCOUNTER — OFFICE VISIT (OUTPATIENT)
Dept: PRIMARY CARE | Facility: CLINIC | Age: OVER 89
End: 2025-06-11
Payer: MEDICARE

## 2025-06-11 VITALS
TEMPERATURE: 97.6 F | HEIGHT: 59 IN | WEIGHT: 105 LBS | HEART RATE: 69 BPM | DIASTOLIC BLOOD PRESSURE: 60 MMHG | BODY MASS INDEX: 21.17 KG/M2 | OXYGEN SATURATION: 98 % | SYSTOLIC BLOOD PRESSURE: 104 MMHG

## 2025-06-11 DIAGNOSIS — K29.00 ACUTE GASTRITIS WITHOUT HEMORRHAGE, UNSPECIFIED GASTRITIS TYPE: Primary | ICD-10-CM

## 2025-06-11 DIAGNOSIS — R19.7 DIARRHEA, UNSPECIFIED TYPE: ICD-10-CM

## 2025-06-11 PROCEDURE — 99213 OFFICE O/P EST LOW 20 MIN: CPT | Performed by: PHYSICIAN ASSISTANT

## 2025-06-11 PROCEDURE — 1125F AMNT PAIN NOTED PAIN PRSNT: CPT | Performed by: PHYSICIAN ASSISTANT

## 2025-06-11 PROCEDURE — 1036F TOBACCO NON-USER: CPT | Performed by: PHYSICIAN ASSISTANT

## 2025-06-11 PROCEDURE — 1159F MED LIST DOCD IN RCRD: CPT | Performed by: PHYSICIAN ASSISTANT

## 2025-06-11 RX ORDER — PANTOPRAZOLE SODIUM 20 MG/1
20 TABLET, DELAYED RELEASE ORAL
Qty: 14 TABLET | Refills: 0 | Status: SHIPPED | OUTPATIENT
Start: 2025-06-11 | End: 2025-06-25

## 2025-06-11 ASSESSMENT — ENCOUNTER SYMPTOMS
ABDOMINAL PAIN: 1
ABDOMINAL DISTENTION: 0
ENDOCRINE NEGATIVE: 1
RECTAL PAIN: 0
BLOOD IN STOOL: 0
OCCASIONAL FEELINGS OF UNSTEADINESS: 0
CONSTIPATION: 0
VOMITING: 0
MUSCULOSKELETAL NEGATIVE: 1
CONSTITUTIONAL NEGATIVE: 1
ANAL BLEEDING: 0
EYES NEGATIVE: 1
LOSS OF SENSATION IN FEET: 0
CARDIOVASCULAR NEGATIVE: 1
NAUSEA: 0
DIARRHEA: 1
RESPIRATORY NEGATIVE: 1
DEPRESSION: 0

## 2025-06-11 ASSESSMENT — PAIN SCALES - GENERAL: PAINLEVEL_OUTOF10: 8

## 2025-06-11 NOTE — PATIENT INSTRUCTIONS
Take the prescribed medication and over the counter align probiotics.    If no resolution in symptoms in 10 days or if symptoms worsen follow up with Dr. Ledesma or myself.     If you experience any blood in the stool, severe stomach pain, or other concerning symptom go to the emergency department

## 2025-06-11 NOTE — PROGRESS NOTES
"Subjective     Patient ID: Kathy Tucker is a 89 y.o. female who presents for GI Problem (Ongoing X 2 weeks if she eats or drinks anything stomach cramps and she has diarrhea right after).    GI Problem  The primary symptoms include abdominal pain (cramping) and diarrhea. Primary symptoms do not include nausea or vomiting.   The illness does not include constipation.     Kathy Tucker is seen for her chronic issues.      Patient normally see's Dr. Can as PCP. She is new to me today. She presents with chief complaint of stomach cramps w/ diarrhea 10-15 minutes after consuming any food or fluid. No blood in the stool. No diarrhea or stomach cramping at any other time. No other ROS. She has not tried any other OTC products.    Review of Systems   Constitutional: Negative.    HENT: Negative.     Eyes: Negative.    Respiratory: Negative.     Cardiovascular: Negative.    Gastrointestinal:  Positive for abdominal pain (cramping) and diarrhea. Negative for abdominal distention, anal bleeding, blood in stool, constipation, nausea, rectal pain and vomiting.   Endocrine: Negative.    Genitourinary: Negative.    Musculoskeletal: Negative.    Skin: Negative.        Objective  Vitals:  /60   Pulse 69   Temp 36.4 °C (97.6 °F)   Ht 1.499 m (4' 11\")   Wt 47.6 kg (105 lb)   SpO2 98%   BMI 21.21 kg/m²     Physical Exam  Vitals and nursing note reviewed.   Constitutional:       General: She is not in acute distress.     Appearance: Normal appearance. She is not ill-appearing, toxic-appearing or diaphoretic.      Comments: Thin     HENT:      Head: Normocephalic and atraumatic.      Right Ear: External ear normal.      Left Ear: External ear normal.      Nose: Nose normal. No congestion.      Mouth/Throat:      Mouth: Mucous membranes are moist.      Pharynx: No oropharyngeal exudate or posterior oropharyngeal erythema.   Eyes:      General:         Right eye: No discharge.         Left eye: No discharge.      Extraocular " Movements: Extraocular movements intact.      Conjunctiva/sclera: Conjunctivae normal.      Pupils: Pupils are equal, round, and reactive to light.   Cardiovascular:      Rate and Rhythm: Normal rate and regular rhythm.      Pulses: Normal pulses.      Heart sounds: Normal heart sounds. No murmur heard.  Pulmonary:      Effort: Pulmonary effort is normal. No respiratory distress.      Breath sounds: Normal breath sounds. No stridor. No wheezing, rhonchi or rales.   Chest:      Chest wall: No tenderness.   Abdominal:      General: Bowel sounds are normal. There is no distension.      Palpations: Abdomen is soft. There is no mass.      Tenderness: There is no abdominal tenderness. There is no right CVA tenderness, left CVA tenderness, guarding or rebound.      Hernia: No hernia is present.   Musculoskeletal:      Cervical back: Normal range of motion.   Skin:     General: Skin is warm.      Capillary Refill: Capillary refill takes less than 2 seconds.      Coloration: Skin is not jaundiced or pale.      Findings: No bruising, erythema, lesion or rash.   Neurological:      General: No focal deficit present.      Mental Status: She is alert and oriented to person, place, and time.      Cranial Nerves: No cranial nerve deficit.      Sensory: No sensory deficit.      Motor: No weakness.      Coordination: Coordination normal.      Gait: Gait abnormal (Walks with a cane. Chronic).      Deep Tendon Reflexes: Reflexes normal.   Psychiatric:         Mood and Affect: Mood normal.         Behavior: Behavior normal.         Thought Content: Thought content normal.         Judgment: Judgment normal.       CBC:   Lab Results   Component Value Date    WBC 4.4 04/24/2025    RBC 4.30 04/24/2025    HGB 13.5 04/24/2025    HCT 40.5 04/24/2025    MCV 94.2 04/24/2025    MCH 31.4 04/24/2025    MCHC 33.3 04/24/2025    RDWS 45.1 05/08/2021    RDWC 12.9 05/08/2021     04/24/2025    MPV 10.2 04/24/2025       CBC w/Diff:   Lab Results  "  Component Value Date    WBC 4.4 04/24/2025    NRBC 0.0 10/08/2024    RBC 4.30 04/24/2025    HGB 13.5 04/24/2025    HCT 40.5 04/24/2025    MCV 94.2 04/24/2025    MCHC 33.3 04/24/2025     04/24/2025    RDW 11.4 04/24/2025    NEUTOPHILPCT 40.9 07/10/2024    IGPCT 0.3 07/10/2024    LYMPHOPCT 38.9 04/24/2025    MONOPCT 7.8 04/24/2025    EOSPCT 4.1 04/24/2025    BASOPCT 1.1 04/24/2025    NEUTROABS 1.56 (L) 07/10/2024    LYMPHSABS 1.88 07/10/2024    MONOSABS 0.27 07/10/2024    EOSABS 180 04/24/2025    BASOSABS 48 04/24/2025        CMP:  Lab Results   Component Value Date    GLUCOSE 84 04/24/2025     04/24/2025    K 4.4 04/24/2025     04/24/2025    CO2 26 04/24/2025    ANIONGAP 8 04/24/2025    BUN 14 04/24/2025    CREATININE 0.83 04/24/2025    CALCIUM 9.2 04/24/2025    ALBUMIN 3.8 04/24/2025    ALKPHOS 46 04/24/2025    PROT 6.0 (L) 04/24/2025    AST 19 04/24/2025    BILITOT 0.6 04/24/2025    ALT 16 04/24/2025        BMP:  Lab Results   Component Value Date    GLUCOSE 84 04/24/2025    BUN 14 04/24/2025    CREATININE 0.83 04/24/2025    UREACREAUR 17.8 05/08/2021     04/24/2025    K 4.4 04/24/2025     04/24/2025    CO2 26 04/24/2025    ANIONGAP 8 04/24/2025    CALCIUM 9.2 04/24/2025    EGFR 67 04/24/2025        Lipid:   Lab Results   Component Value Date    CHOL 202 (H) 10/08/2024    HDL 68.6 10/08/2024    CHHDL 2.9 10/08/2024    LDLCALC 124 (H) 10/08/2024    VLDL 10 10/08/2024    TRIG 49 10/08/2024       LDL Direct:  No results found for: \"LDLDIRECT\"     TSH:   Lab Results   Component Value Date    TSH 1.64 10/08/2024        B12:  Lab Results   Component Value Date    YFQLRKNF86 991 (H) 12/22/2023       Vitamin D:  Lab Results   Component Value Date    VITD25 56 10/08/2024        HgA1c:   No results found for: \"HGBA1C\", \"[id number]\"    PSA:   No results found for: \"PSA\"    FreeT4:  No results found for: \"FREET4\"    T3:   No results found for: \"T3FREE\"    CRCL calculated to be 35 based on " 4/24/2025 labs.    CT Angio abdomen/pelvis 1/2024  FINDINGS:  Lower Chest: There is displacement of mediastinal structures to the  left hemithorax with hyperinflation of the right lung consistent with  history of left pneumonectomy.      Abdomen:  Liver: Unremarkable.  Bile Ducts: Normal caliber.  Gallbladder: Surgical clips are noted in the gallbladder fossa.  Pancreas: Unremarkable.  Spleen: Unremarkable.  Adrenals: Normal.  Kidneys: Left renal cyst measures approximately 7 mm in size.      Pelvis:  Pessary is noted within the pelvis.  Bladder: Unremarkable.      Bowel: No bowel wall thickening or abnormal distention to suggest  bowel obstruction. Diverticulosis is noted of the sigmoid colon  without evidence for diverticulitis.. Mesenteric Lymph Nodes: No  enlarged mesenteric lymph nodes. Peritoneum: There is no free fluid  or free intraperitoneal gas. There are no localized fluid  collections. Vessels: No evidence for aortic aneurysm or aortic  dissection is noted. Common iliac arteries, external iliac arteries  and proximal superficial femoral arteries appear unremarkable.  Retroperitoneum: No retroperitoneal adenopathy. Abdominal Wall:  Unremarkable. Bones: No acute bony abnormalities.      IMPRESSION:  Left renal cyst.      Postsurgical changes from left pneumonectomy  1. Acute gastritis without hemorrhage, unspecified gastritis type  pantoprazole (Protonix) 20 mg EC tablet   Likely acute gastroenteritis vs gastritis. Less likely IBD given no prior presentation. No Abdominal cramping or diarrhea if something is not consumed. Considered gastroparesis but without any history of the condition and no new medications or dietary change, less likely. Less likely infectious given no blood in the stool and no diarrhea when not stimulated. Unlikely bowel obstruction as she is still passing stool and no distended abdomen. Unlikely ischemic gut. Unlikely food poisoning. Unlikely celiac given no prior dx/symptoms. CTA  abdomen/pelvis without evidence of malignancy 1/2024, evidence of diverticulosis is present but patient has no LLQ abdominal pain or periumbilical pain to suggest acute diverticulitis    2. Diarrhea, unspecified type  pantoprazole (Protonix) 20 mg EC tablet        Take the prescribed medication and over the counter align probiotics.    If no resolution in symptoms in 10 days or if symptoms worsen follow up with Dr. Ledesma or myself.     If you experience any blood in the stool, severe stomach pain, or other concerning symptom go to the emergency department

## 2025-09-03 ENCOUNTER — APPOINTMENT (OUTPATIENT)
Dept: PRIMARY CARE | Facility: CLINIC | Age: OVER 89
End: 2025-09-03
Payer: MEDICARE

## 2025-09-04 ENCOUNTER — OFFICE VISIT (OUTPATIENT)
Dept: PRIMARY CARE | Facility: CLINIC | Age: OVER 89
End: 2025-09-04
Payer: MEDICARE

## 2025-09-04 VITALS
DIASTOLIC BLOOD PRESSURE: 80 MMHG | OXYGEN SATURATION: 98 % | TEMPERATURE: 97.3 F | WEIGHT: 105 LBS | HEIGHT: 59 IN | BODY MASS INDEX: 21.17 KG/M2 | SYSTOLIC BLOOD PRESSURE: 112 MMHG | HEART RATE: 64 BPM

## 2025-09-04 DIAGNOSIS — J44.9 CHRONIC OBSTRUCTIVE PULMONARY DISEASE, UNSPECIFIED COPD TYPE (MULTI): ICD-10-CM

## 2025-09-04 DIAGNOSIS — R35.0 URINARY FREQUENCY: Primary | ICD-10-CM

## 2025-09-04 DIAGNOSIS — J43.9 PULMONARY EMPHYSEMA, UNSPECIFIED EMPHYSEMA TYPE (MULTI): ICD-10-CM

## 2025-09-04 DIAGNOSIS — C34.92 MALIGNANT NEOPLASM OF LEFT LUNG, UNSPECIFIED PART OF LUNG (MULTI): ICD-10-CM

## 2025-09-04 DIAGNOSIS — R19.5 LOOSE STOOLS: ICD-10-CM

## 2025-09-04 PROCEDURE — G2211 COMPLEX E/M VISIT ADD ON: HCPCS | Performed by: STUDENT IN AN ORGANIZED HEALTH CARE EDUCATION/TRAINING PROGRAM

## 2025-09-04 PROCEDURE — 99214 OFFICE O/P EST MOD 30 MIN: CPT | Performed by: STUDENT IN AN ORGANIZED HEALTH CARE EDUCATION/TRAINING PROGRAM

## 2025-09-04 PROCEDURE — 1159F MED LIST DOCD IN RCRD: CPT | Performed by: STUDENT IN AN ORGANIZED HEALTH CARE EDUCATION/TRAINING PROGRAM

## 2025-09-04 PROCEDURE — 1126F AMNT PAIN NOTED NONE PRSNT: CPT | Performed by: STUDENT IN AN ORGANIZED HEALTH CARE EDUCATION/TRAINING PROGRAM

## 2025-09-04 ASSESSMENT — PATIENT HEALTH QUESTIONNAIRE - PHQ9
6. FEELING BAD ABOUT YOURSELF - OR THAT YOU ARE A FAILURE OR HAVE LET YOURSELF OR YOUR FAMILY DOWN: NOT AT ALL
1. LITTLE INTEREST OR PLEASURE IN DOING THINGS: SEVERAL DAYS
8. MOVING OR SPEAKING SO SLOWLY THAT OTHER PEOPLE COULD HAVE NOTICED. OR THE OPPOSITE, BEING SO FIGETY OR RESTLESS THAT YOU HAVE BEEN MOVING AROUND A LOT MORE THAN USUAL: NOT AT ALL
SUM OF ALL RESPONSES TO PHQ QUESTIONS 1-9: 11
4. FEELING TIRED OR HAVING LITTLE ENERGY: NEARLY EVERY DAY
9. THOUGHTS THAT YOU WOULD BE BETTER OFF DEAD, OR OF HURTING YOURSELF: NOT AT ALL
2. FEELING DOWN, DEPRESSED OR HOPELESS: SEVERAL DAYS
SUM OF ALL RESPONSES TO PHQ9 QUESTIONS 1 AND 2: 2
3. TROUBLE FALLING OR STAYING ASLEEP OR SLEEPING TOO MUCH: NEARLY EVERY DAY
7. TROUBLE CONCENTRATING ON THINGS, SUCH AS READING THE NEWSPAPER OR WATCHING TELEVISION: NOT AT ALL
5. POOR APPETITE OR OVEREATING: NEARLY EVERY DAY

## 2025-09-04 ASSESSMENT — PAIN SCALES - GENERAL: PAINLEVEL_OUTOF10: 0-NO PAIN

## 2025-09-05 PROBLEM — C34.92: Status: ACTIVE | Noted: 2025-09-05

## 2025-09-06 LAB
APPEARANCE UR: CLEAR
BACTERIA #/AREA URNS HPF: ABNORMAL /HPF
BACTERIA UR CULT: ABNORMAL
BACTERIA UR CULT: ABNORMAL
BILIRUB UR QL STRIP: NEGATIVE
COLOR UR: YELLOW
GLUCOSE UR QL STRIP: NEGATIVE
HGB UR QL STRIP: NEGATIVE
HYALINE CASTS #/AREA URNS LPF: ABNORMAL /LPF
KETONES UR QL STRIP: NEGATIVE
LEUKOCYTE ESTERASE UR QL STRIP: ABNORMAL
NITRITE UR QL STRIP: NEGATIVE
PH UR STRIP: 6.5 [PH] (ref 5–8)
PROT UR QL STRIP: NEGATIVE
RBC #/AREA URNS HPF: ABNORMAL /HPF
SERVICE CMNT-IMP: ABNORMAL
SP GR UR STRIP: 1.02 (ref 1–1.03)
SQUAMOUS #/AREA URNS HPF: ABNORMAL /HPF
WBC #/AREA URNS HPF: ABNORMAL /HPF